# Patient Record
Sex: FEMALE | Race: WHITE
[De-identification: names, ages, dates, MRNs, and addresses within clinical notes are randomized per-mention and may not be internally consistent; named-entity substitution may affect disease eponyms.]

---

## 2017-04-06 ENCOUNTER — HOSPITAL ENCOUNTER (INPATIENT)
Dept: HOSPITAL 12 - SRC | Age: 32
LOS: 9 days | Discharge: HOME | DRG: 895 | End: 2017-04-15
Attending: INTERNAL MEDICINE | Admitting: INTERNAL MEDICINE
Payer: COMMERCIAL

## 2017-04-06 VITALS — WEIGHT: 125 LBS | BODY MASS INDEX: 18.94 KG/M2 | HEIGHT: 68 IN

## 2017-04-06 DIAGNOSIS — F13.230: ICD-10-CM

## 2017-04-06 DIAGNOSIS — E28.2: ICD-10-CM

## 2017-04-06 DIAGNOSIS — F10.230: Primary | ICD-10-CM

## 2017-04-06 DIAGNOSIS — E87.6: ICD-10-CM

## 2017-04-06 DIAGNOSIS — Z81.8: ICD-10-CM

## 2017-04-06 DIAGNOSIS — Z81.1: ICD-10-CM
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
## 2017-04-06 DIAGNOSIS — F41.9: ICD-10-CM

## 2017-04-06 DIAGNOSIS — F12.90: ICD-10-CM

## 2017-04-06 DIAGNOSIS — S02.609D: ICD-10-CM

## 2017-04-06 DIAGNOSIS — Z79.899: ICD-10-CM

## 2017-04-06 DIAGNOSIS — E86.0: ICD-10-CM

## 2017-04-06 DIAGNOSIS — G89.21: ICD-10-CM
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
## 2017-04-06 DIAGNOSIS — F90.9: ICD-10-CM

## 2017-04-06 DIAGNOSIS — F32.9: ICD-10-CM

## 2017-04-06 DIAGNOSIS — G43.909: ICD-10-CM

## 2017-04-06 DIAGNOSIS — E83.42: ICD-10-CM

## 2017-04-06 DIAGNOSIS — K21.9: ICD-10-CM

## 2017-04-06 PROCEDURE — C9113 INJ PANTOPRAZOLE SODIUM, VIA: HCPCS

## 2017-04-06 PROCEDURE — A4663 DIALYSIS BLOOD PRESSURE CUFF: HCPCS

## 2017-04-08 VITALS — SYSTOLIC BLOOD PRESSURE: 124 MMHG | DIASTOLIC BLOOD PRESSURE: 76 MMHG

## 2017-04-08 VITALS — DIASTOLIC BLOOD PRESSURE: 66 MMHG | SYSTOLIC BLOOD PRESSURE: 112 MMHG

## 2017-04-08 VITALS — SYSTOLIC BLOOD PRESSURE: 97 MMHG | DIASTOLIC BLOOD PRESSURE: 68 MMHG

## 2017-04-08 VITALS — DIASTOLIC BLOOD PRESSURE: 69 MMHG | SYSTOLIC BLOOD PRESSURE: 109 MMHG

## 2017-04-08 VITALS — DIASTOLIC BLOOD PRESSURE: 82 MMHG | SYSTOLIC BLOOD PRESSURE: 127 MMHG

## 2017-04-08 LAB
ALBUMIN SERPL BCG-MCNC: 3.4 G/DL (ref 3.4–5)
ALP SERPL-CCNC: 86 U/L (ref 50–136)
ALT SERPL W/O P-5'-P-CCNC: 25 U/L (ref 14–59)
AMPHETAMINES UR QL SCN>1000 NG/ML: NEGATIVE
AST SERPL-CCNC: 24 U/L (ref 15–37)
BARBITURATES UR QL SCN: NEGATIVE
BASOPHILS # BLD AUTO: 0 K/UL (ref 0–0.2)
BASOPHILS NFR BLD AUTO: 0.5 % (ref 0–2)
BILIRUB SERPL-MCNC: 0.2 MG/DL (ref 0.2–1)
BUN SERPL-MCNC: 9 MG/DL (ref 7–18)
CALCIUM SERPL-MCNC: 8.3 MG/DL (ref 8.5–10.1)
CHLORIDE SERPL-SCNC: 101 MMOL/L (ref 98–107)
CO2 SERPL-SCNC: 27 MMOL/L (ref 21–32)
COCAINE UR QL SCN: NEGATIVE
CREAT SERPL-MCNC: 0.8 MG/DL (ref 0.6–1.3)
EOSINOPHIL # BLD AUTO: 0.1 K/UL (ref 0–0.7)
EOSINOPHIL NFR BLD AUTO: 0.9 % (ref 0–7)
ERYTHROCYTE [DISTWIDTH] IN BLOOD BY AUTOMATED COUNT: 12.1 % (ref 11.5–14.5)
ETHANOL SERPL-MCNC: 69 MG/DL (ref 0–0)
GFR SERPLBLD BASED ON 1.73 SQ M-ARVRAT: 84 ML/MIN (ref 60–?)
GLUCOSE SERPL-MCNC: 100 MG/DL (ref 74–106)
HCG UR QL: NEGATIVE
HCT VFR BLD AUTO: 40.3 % (ref 37–47)
HGB BLD-MCNC: 14.2 G/DL (ref 12–16)
HIV 1+2 AB+HIV1P24 AG SERPLBLD IA.RAPID: NON REACTIVE
LYMPHOCYTES # BLD AUTO: 4 K/UL (ref 0.8–4.8)
LYMPHOCYTES NFR BLD AUTO: 49.5 % (ref 20.5–51.5)
MAGNESIUM SERPL-MCNC: 1.3 MG/DL (ref 1.8–2.4)
MCH RBC QN AUTO: 32.2 UUG (ref 27–31)
MCHC RBC AUTO-ENTMCNC: 35 G/DL (ref 32–37)
MCV RBC AUTO: 91.4 FL (ref 81–99)
MONOCYTES # BLD AUTO: 0.5 K/UL (ref 0.1–1.3)
MONOCYTES NFR BLD AUTO: 6.2 % (ref 0–11)
NEUTROPHILS # BLD AUTO: 3.5 K/UL (ref 1.8–8.9)
NEUTROPHILS NFR BLD AUTO: 42.9 % (ref 38.5–71.5)
OPIATES UR QL SCN: NEGATIVE
PCP UR QL SCN>25 NG/ML: NEGATIVE
PLATELET # BLD AUTO: 260 K/UL (ref 150–450)
POTASSIUM SERPL-SCNC: 3.2 MMOL/L (ref 3.5–5.1)
RBC # BLD AUTO: 4.41 MIL/UL (ref 4.2–5.4)
SODIUM SERPL-SCNC: 139 MMOL/L (ref 136–145)
THC UR QL SCN>50 NG/ML: POSITIVE
TSH SERPL DL<=0.005 MIU/L-ACNC: 1.9 MIU/ML (ref 0.36–3.74)
WBC # BLD AUTO: 8.1 K/UL (ref 4–11.2)
WS STN SPEC: 6.7 G/DL (ref 6.4–8.2)

## 2017-04-08 PROCEDURE — HZ2ZZZZ DETOXIFICATION SERVICES FOR SUBSTANCE ABUSE TREATMENT: ICD-10-PCS | Performed by: INTERNAL MEDICINE

## 2017-04-08 RX ADMIN — MAGNESIUM HYDROXIDE PRN ML: 400 SUSPENSION ORAL at 03:16

## 2017-04-08 RX ADMIN — MAGNESIUM HYDROXIDE PRN ML: 400 SUSPENSION ORAL at 22:59

## 2017-04-08 RX ADMIN — Medication PRN MG: at 17:41

## 2017-04-08 RX ADMIN — LORAZEPAM SCH MG: 1 TABLET ORAL at 21:11

## 2017-04-08 RX ADMIN — METHOCARBAMOL TABLETS PRN MG: 750 TABLET, COATED ORAL at 06:12

## 2017-04-08 RX ADMIN — DICYCLOMINE HYDROCHLORIDE PRN MG: 20 TABLET ORAL at 21:11

## 2017-04-08 RX ADMIN — THERA TABS SCH UDTAB: TAB at 08:45

## 2017-04-08 RX ADMIN — KETOROLAC TROMETHAMINE PRN MG: 30 INJECTION, SOLUTION INTRAMUSCULAR; INTRAVENOUS at 22:59

## 2017-04-08 RX ADMIN — BUPRENORPHINE HYDROCHLORIDE SCH MG: 2 TABLET SUBLINGUAL at 21:12

## 2017-04-08 RX ADMIN — Medication PRN MG: at 06:12

## 2017-04-08 RX ADMIN — SPIRONOLACTONE SCH MG: 50 TABLET, FILM COATED ORAL at 17:36

## 2017-04-08 RX ADMIN — Medication SCH MG: at 12:51

## 2017-04-08 RX ADMIN — BUPRENORPHINE HYDROCHLORIDE SCH MG: 2 TABLET SUBLINGUAL at 17:36

## 2017-04-08 RX ADMIN — LORAZEPAM SCH MG: 1 TABLET ORAL at 12:50

## 2017-04-08 RX ADMIN — CLONIDINE HYDROCHLORIDE PRN MG: 0.1 TABLET ORAL at 03:16

## 2017-04-08 RX ADMIN — BUPRENORPHINE HYDROCHLORIDE SCH MG: 2 TABLET SUBLINGUAL at 12:51

## 2017-04-08 RX ADMIN — LORAZEPAM SCH MG: 1 TABLET ORAL at 17:36

## 2017-04-08 RX ADMIN — TOPIRAMATE SCH MG: 100 TABLET, COATED ORAL at 12:51

## 2017-04-08 RX ADMIN — ONDANSETRON PRN MG: 4 TABLET, ORALLY DISINTEGRATING ORAL at 03:16

## 2017-04-08 NOTE — NUR
PRN Administration

Patient complaine of 7/10 jaw pain, body aches & mild headache. PRN Robaxin & Motrin given 
as ordered. Will continue to monitor.
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
## 2017-04-08 NOTE — NUR
PRN Reassessment

Patient verbalized slight relief from body aches and headache. Patient lying in bed and not 
in any s/s of distress. Will continue to monitor patient.
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
## 2017-04-08 NOTE — NUR
PRN administration

Patient complains of anxiety, constipation & nausea. Patient reported that her last bowel 
mvoement was a week ago. No episode of vomiting noted. Vitals WNL. PRN Clonidine, MOM, 
Zofran given as ordered. Will reassess for effectiveness of medicaiton

## 2017-04-08 NOTE — NUR
PRN Reassessment

Patient verbalized improved nasuea and verbalized decrease in anxiety. PRn medication 
effective. Will continue to monitor bowel movement.

## 2017-04-08 NOTE — NUR
END of shit note:

Patient is a 30 y/o female admitted for Opiate dependence. Patient is on a soft diet with 
multiple allergies noted. Full Code status. Patient was given PRn Clonidine for anxiety, MOM 
for constipation, Zofran for nausea, Motrin for headache & robaxin for body aches and were 
effective. During assessment, pt was having inconsistencies of reported allergies. Will 
followup with Dr. Pelayo. Initial COWS is 10. Last COWS is 8. Pt remained stable and vitals 
WNL. Pt was having trouble sleeping and slept for 1 hour. Pt consumed 355ml of fluids. 
Voided 1x with no bowel movement noted. Safety precautions are in place. will continue to 
monitor.

## 2017-04-08 NOTE — NUR
Urine collected

Urine for drug screen collected. Pt able to void clear yellow urine freely with no problems.
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
## 2017-04-08 NOTE — NUR
PRN REASSESSMENT

Medication effective, pt reported a decrease in pain to 1/10, all needs met all safety 
measures in place.
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
## 2017-04-08 NOTE — NUR
PRN MEDICATION

Pt c/o toothache/ jaw pain rating it 5/10 requested something for relief, 
non-pharmacological techniques interventions provided x3 and were not effective. PRN Motrin 
400mg administered PO, educated pt about s/e of medication and when to contact nurse. all 
needs met, all safety measures in place, will continue to monitor.

## 2017-04-09 VITALS — DIASTOLIC BLOOD PRESSURE: 79 MMHG | SYSTOLIC BLOOD PRESSURE: 122 MMHG

## 2017-04-09 VITALS — SYSTOLIC BLOOD PRESSURE: 117 MMHG | DIASTOLIC BLOOD PRESSURE: 84 MMHG

## 2017-04-09 VITALS — DIASTOLIC BLOOD PRESSURE: 90 MMHG | SYSTOLIC BLOOD PRESSURE: 133 MMHG

## 2017-04-09 VITALS — SYSTOLIC BLOOD PRESSURE: 124 MMHG | DIASTOLIC BLOOD PRESSURE: 88 MMHG

## 2017-04-09 VITALS — SYSTOLIC BLOOD PRESSURE: 116 MMHG | DIASTOLIC BLOOD PRESSURE: 80 MMHG

## 2017-04-09 VITALS — DIASTOLIC BLOOD PRESSURE: 80 MMHG | SYSTOLIC BLOOD PRESSURE: 116 MMHG

## 2017-04-09 LAB
BUN SERPL-MCNC: 9 MG/DL (ref 7–18)
CALCIUM SERPL-MCNC: 9.2 MG/DL (ref 8.5–10.1)
CHLORIDE SERPL-SCNC: 99 MMOL/L (ref 98–107)
CO2 SERPL-SCNC: 29 MMOL/L (ref 21–32)
CREAT SERPL-MCNC: 0.8 MG/DL (ref 0.6–1.3)
GFR SERPLBLD BASED ON 1.73 SQ M-ARVRAT: 84 ML/MIN (ref 60–?)
GLUCOSE SERPL-MCNC: 110 MG/DL (ref 74–106)
MAGNESIUM SERPL-MCNC: 2 MG/DL (ref 1.8–2.4)
POTASSIUM SERPL-SCNC: 4.2 MMOL/L (ref 3.5–5.1)
SODIUM SERPL-SCNC: 136 MMOL/L (ref 136–145)

## 2017-04-09 PROCEDURE — HZ51ZZZ INDIVIDUAL PSYCHOTHERAPY FOR SUBSTANCE ABUSE TREATMENT, BEHAVIORAL: ICD-10-PCS

## 2017-04-09 RX ADMIN — SPIRONOLACTONE SCH MG: 50 TABLET, FILM COATED ORAL at 09:31

## 2017-04-09 RX ADMIN — GABAPENTIN SCH MG: 300 CAPSULE ORAL at 21:12

## 2017-04-09 RX ADMIN — METHOCARBAMOL TABLETS PRN MG: 750 TABLET, COATED ORAL at 17:00

## 2017-04-09 RX ADMIN — FOLIC ACID SCH MG: 1 TABLET ORAL at 09:26

## 2017-04-09 RX ADMIN — Medication SCH MG: at 09:26

## 2017-04-09 RX ADMIN — Medication SCH EA: at 09:28

## 2017-04-09 RX ADMIN — LORAZEPAM SCH MG: 1 TABLET ORAL at 15:06

## 2017-04-09 RX ADMIN — THERA TABS SCH UDTAB: TAB at 09:26

## 2017-04-09 RX ADMIN — GABAPENTIN SCH MG: 300 CAPSULE ORAL at 09:26

## 2017-04-09 RX ADMIN — Medication PRN MG: at 04:44

## 2017-04-09 RX ADMIN — LORAZEPAM SCH MG: 1 TABLET ORAL at 21:12

## 2017-04-09 RX ADMIN — Medication SCH MG: at 09:27

## 2017-04-09 RX ADMIN — BUPRENORPHINE HYDROCHLORIDE SCH MG: 2 TABLET SUBLINGUAL at 15:06

## 2017-04-09 RX ADMIN — GABAPENTIN SCH MG: 300 CAPSULE ORAL at 15:06

## 2017-04-09 RX ADMIN — BUPRENORPHINE HYDROCHLORIDE SCH MG: 2 TABLET SUBLINGUAL at 21:13

## 2017-04-09 RX ADMIN — SPIRONOLACTONE SCH MG: 50 TABLET, FILM COATED ORAL at 16:56

## 2017-04-09 RX ADMIN — LORAZEPAM SCH MG: 1 TABLET ORAL at 09:26

## 2017-04-09 RX ADMIN — BUPRENORPHINE HYDROCHLORIDE SCH MG: 2 TABLET SUBLINGUAL at 09:27

## 2017-04-09 RX ADMIN — THERA TABS SCH UDTAB: TAB at 09:36

## 2017-04-09 RX ADMIN — KETOROLAC TROMETHAMINE PRN MG: 30 INJECTION, SOLUTION INTRAMUSCULAR; INTRAVENOUS at 15:09

## 2017-04-09 RX ADMIN — TOPIRAMATE SCH MG: 100 TABLET, COATED ORAL at 09:27
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
## 2017-04-09 NOTE — NUR
PRN Motrin:



Pt complains of jaw pain. Pt rates pain 5/10. Administered PRN Motrin as ordered. Will 
continue to monitor.
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
## 2017-04-09 NOTE — NUR
Reassessment:



Pt states that her jaw pain is now 3/10. PRN Toradol effective AEB decrease in pain from 
8/10 to 3/10. Will continue to monitor.
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
## 2017-04-10 VITALS — SYSTOLIC BLOOD PRESSURE: 107 MMHG | DIASTOLIC BLOOD PRESSURE: 79 MMHG

## 2017-04-10 VITALS — DIASTOLIC BLOOD PRESSURE: 85 MMHG | SYSTOLIC BLOOD PRESSURE: 126 MMHG

## 2017-04-10 VITALS — SYSTOLIC BLOOD PRESSURE: 148 MMHG | DIASTOLIC BLOOD PRESSURE: 112 MMHG

## 2017-04-10 VITALS — DIASTOLIC BLOOD PRESSURE: 71 MMHG | SYSTOLIC BLOOD PRESSURE: 115 MMHG

## 2017-04-10 VITALS — DIASTOLIC BLOOD PRESSURE: 80 MMHG | SYSTOLIC BLOOD PRESSURE: 119 MMHG

## 2017-04-10 VITALS — DIASTOLIC BLOOD PRESSURE: 78 MMHG | SYSTOLIC BLOOD PRESSURE: 122 MMHG

## 2017-04-10 VITALS — SYSTOLIC BLOOD PRESSURE: 126 MMHG | DIASTOLIC BLOOD PRESSURE: 79 MMHG

## 2017-04-10 RX ADMIN — Medication SCH MG: at 09:11

## 2017-04-10 RX ADMIN — DEXTROSE AND SODIUM CHLORIDE SCH MLS/HR: 5; .45 INJECTION, SOLUTION INTRAVENOUS at 14:33

## 2017-04-10 RX ADMIN — THERA TABS SCH UDTAB: TAB at 09:00

## 2017-04-10 RX ADMIN — LORAZEPAM SCH MG: 1 TABLET ORAL at 12:27

## 2017-04-10 RX ADMIN — SPIRONOLACTONE SCH MG: 50 TABLET, FILM COATED ORAL at 09:11

## 2017-04-10 RX ADMIN — SPIRONOLACTONE SCH MG: 50 TABLET, FILM COATED ORAL at 16:00

## 2017-04-10 RX ADMIN — Medication SCH EA: at 09:10

## 2017-04-10 RX ADMIN — Medication PRN MG: at 16:00

## 2017-04-10 RX ADMIN — GABAPENTIN SCH MG: 300 CAPSULE ORAL at 09:11

## 2017-04-10 RX ADMIN — KETOROLAC TROMETHAMINE PRN MG: 30 INJECTION, SOLUTION INTRAMUSCULAR; INTRAVENOUS at 12:27

## 2017-04-10 RX ADMIN — GABAPENTIN SCH MG: 300 CAPSULE ORAL at 16:00

## 2017-04-10 RX ADMIN — TOPIRAMATE SCH MG: 100 TABLET, COATED ORAL at 09:11

## 2017-04-10 RX ADMIN — POLYETHYLENE GLYCOL 3350 PRN GM: 17 POWDER, FOR SOLUTION ORAL at 20:59

## 2017-04-10 RX ADMIN — LORAZEPAM SCH MG: 1 TABLET ORAL at 09:17

## 2017-04-10 RX ADMIN — DICYCLOMINE HYDROCHLORIDE PRN MG: 20 TABLET ORAL at 16:00

## 2017-04-10 RX ADMIN — NAPHAZOLINE HYDROCHLORIDE AND PHENIRAMINE MALEATE PRN ML: .25; 3 SOLUTION/ DROPS OPHTHALMIC at 21:01

## 2017-04-10 RX ADMIN — THERA TABS SCH UDTAB: TAB at 09:11

## 2017-04-10 RX ADMIN — BUPRENORPHINE HYDROCHLORIDE SCH MG: 2 TABLET SUBLINGUAL at 20:29

## 2017-04-10 RX ADMIN — LORAZEPAM SCH MG: 1 TABLET ORAL at 16:05

## 2017-04-10 RX ADMIN — FOLIC ACID SCH MG: 1 TABLET ORAL at 09:11

## 2017-04-10 RX ADMIN — LORAZEPAM SCH MG: 1 TABLET ORAL at 20:29

## 2017-04-10 RX ADMIN — ONDANSETRON PRN MG: 4 TABLET, ORALLY DISINTEGRATING ORAL at 12:27

## 2017-04-10 RX ADMIN — DEXTROSE AND SODIUM CHLORIDE SCH MLS/HR: 5; .45 INJECTION, SOLUTION INTRAVENOUS at 21:05

## 2017-04-10 RX ADMIN — DEXTROSE SCH MG: 50 INJECTION, SOLUTION INTRAVENOUS at 14:26

## 2017-04-10 RX ADMIN — BUPRENORPHINE HYDROCHLORIDE SCH MG: 2 TABLET SUBLINGUAL at 16:00
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
## 2017-04-10 NOTE — NUR
End of Shift



Patient is a 31-year old,  female, admitted for  ETOH, Opiates, and Benzodiazepines 
dependence.  Pt is on a 5 day Subutex and 5 day Valium tapers, started 04/08/2017. Patient 
is tolerating tapers well.  Pt is AAOx4, no SOB nor anxiety noted. Pt is ambulatory with 
steady gait and with no skin issues. Fall, universal and safety prec implemented. Call light 
within reach. Kept pt warm, dry and comfortable. COWS=4, CIWA=3, slept for 5 hours. Endorsed 
to AM shift nurse for continuity of care.

## 2017-04-10 NOTE — NUR
PRN 

Upon reassessment, no reports of bowel movement. will continue to monitor effectiveness of 
Miralax. 

Pt states relief in itchiness in eyes. eye drops effective. 

Safety measures in place, Will continue to monitor. 




-------------------------------------------------------------------------------

Addendum: 04/10/17 at 2342 by VICTOR MANUEL WILLIS RN

-------------------------------------------------------------------------------

PRN REASSESSMENT

## 2017-04-10 NOTE — NUR
PRN Administration 

Pt reports constipation, requested aid. Miralax 17 gm PRN administered. 

Pt reports itchiness and discomfort in eyes. Naphcon-A Opht drops administered. 

Safety measures in place, will continue to monitor.
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
## 2017-04-10 NOTE — NUR
V/S-Elevated BP

1600H  V/S: T. 98.1  HR 87  RR 18  /112 SpO2 98% RA  0/10 pain  COWS 5 CIWA 5; 
Scheduled medications given and reassessment at 1630 of /80 and HR 63 after medication 
administration. Will cont. to monitor the pt.

## 2017-04-10 NOTE — NUR
Reassessment

Pt is in room asleep with a nap with HOB semi-fowlers; no non-verbal clues of nausea or pain 
present at this time so Toradol and Zofran is effective. Will cont. to monitor the pt.
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
## 2017-04-10 NOTE — NUR
IVF 

New bag of  D5 1/2 NS started,  running at 125 cc/hr. IV site intact, patent/flushing well. 

Safety measures in place, Will continue to monitor.
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
## 2017-04-11 VITALS — DIASTOLIC BLOOD PRESSURE: 77 MMHG | SYSTOLIC BLOOD PRESSURE: 131 MMHG

## 2017-04-11 VITALS — DIASTOLIC BLOOD PRESSURE: 69 MMHG | SYSTOLIC BLOOD PRESSURE: 111 MMHG

## 2017-04-11 VITALS — SYSTOLIC BLOOD PRESSURE: 119 MMHG | DIASTOLIC BLOOD PRESSURE: 85 MMHG

## 2017-04-11 VITALS — DIASTOLIC BLOOD PRESSURE: 68 MMHG | SYSTOLIC BLOOD PRESSURE: 110 MMHG

## 2017-04-11 VITALS — SYSTOLIC BLOOD PRESSURE: 94 MMHG | DIASTOLIC BLOOD PRESSURE: 67 MMHG

## 2017-04-11 VITALS — DIASTOLIC BLOOD PRESSURE: 87 MMHG | SYSTOLIC BLOOD PRESSURE: 118 MMHG

## 2017-04-11 RX ADMIN — POLYETHYLENE GLYCOL 3350 PRN GM: 17 POWDER, FOR SOLUTION ORAL at 16:14

## 2017-04-11 RX ADMIN — Medication SCH EA: at 08:32

## 2017-04-11 RX ADMIN — KETOROLAC TROMETHAMINE PRN MG: 30 INJECTION, SOLUTION INTRAMUSCULAR; INTRAVENOUS at 01:55

## 2017-04-11 RX ADMIN — LORAZEPAM SCH MG: 1 TABLET ORAL at 16:06

## 2017-04-11 RX ADMIN — LORAZEPAM SCH MG: 1 TABLET ORAL at 08:33

## 2017-04-11 RX ADMIN — THERA TABS SCH UDTAB: TAB at 08:33

## 2017-04-11 RX ADMIN — BUPRENORPHINE HYDROCHLORIDE SCH MG: 2 TABLET SUBLINGUAL at 08:32

## 2017-04-11 RX ADMIN — FOLIC ACID SCH MG: 1 TABLET ORAL at 08:33

## 2017-04-11 RX ADMIN — NAPHAZOLINE HYDROCHLORIDE AND PHENIRAMINE MALEATE PRN ML: .25; 3 SOLUTION/ DROPS OPHTHALMIC at 08:44

## 2017-04-11 RX ADMIN — DEXTROSE SCH MG: 50 INJECTION, SOLUTION INTRAVENOUS at 08:33

## 2017-04-11 RX ADMIN — BUPRENORPHINE HYDROCHLORIDE SCH MG: 2 TABLET SUBLINGUAL at 20:58

## 2017-04-11 RX ADMIN — GABAPENTIN SCH MG: 300 CAPSULE ORAL at 08:32

## 2017-04-11 RX ADMIN — SPIRONOLACTONE SCH MG: 50 TABLET, FILM COATED ORAL at 08:32

## 2017-04-11 RX ADMIN — Medication SCH MG: at 08:32

## 2017-04-11 RX ADMIN — Medication PRN MG: at 16:14

## 2017-04-11 RX ADMIN — CLONIDINE HYDROCHLORIDE PRN MG: 0.1 TABLET ORAL at 08:51

## 2017-04-11 RX ADMIN — METHOCARBAMOL TABLETS PRN MG: 750 TABLET, COATED ORAL at 05:45

## 2017-04-11 RX ADMIN — GABAPENTIN SCH MG: 300 CAPSULE ORAL at 20:58

## 2017-04-11 RX ADMIN — THERA TABS SCH UDTAB: TAB at 08:34

## 2017-04-11 RX ADMIN — TOPIRAMATE SCH MG: 100 TABLET, COATED ORAL at 08:33

## 2017-04-11 RX ADMIN — SPIRONOLACTONE SCH MG: 50 TABLET, FILM COATED ORAL at 16:06

## 2017-04-11 RX ADMIN — GABAPENTIN SCH MG: 300 CAPSULE ORAL at 16:06

## 2017-04-11 RX ADMIN — LORAZEPAM SCH MG: 1 TABLET ORAL at 20:58

## 2017-04-11 RX ADMIN — DEXTROSE AND SODIUM CHLORIDE SCH MLS/HR: 5; .45 INJECTION, SOLUTION INTRAVENOUS at 05:06

## 2017-04-11 RX ADMIN — KETOROLAC TROMETHAMINE PRN MG: 30 INJECTION, SOLUTION INTRAMUSCULAR; INTRAVENOUS at 08:51

## 2017-04-11 RX ADMIN — BUPRENORPHINE HYDROCHLORIDE SCH MG: 2 TABLET SUBLINGUAL at 16:06

## 2017-04-11 RX ADMIN — Medication PRN MG: at 08:51

## 2017-04-11 NOTE — NUR
PRN Medication Administration 

Pt c/o burning eyes, jaw and generalized pain 8/10 and is very anxious with restlessness, 
crying, irritability, agitation and pacing, ; PRN Naphcon-A eye gtts, Toradol 30mg IM, 
and Clonidine 0.1mg given with 0900H scheduled medications. Will reassess in 1H.  

-------------------------------------------------------------------------------

Addendum: 04/11/17 at 1659 by BRIDGET SALGADO RN

-------------------------------------------------------------------------------

PRN Colace 250mg given also since pt states that she has not had a BM in 3 days.

## 2017-04-11 NOTE — NUR
PRN Reassessment

Upon reassessment, of Toradol, pt is sleeping, no verbal cues of pain verbalized/noted. 

Safety measures in place, Will continue to monitor.
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
## 2017-04-11 NOTE — NUR
PRN Robaxin Reassessment

Upon reassessment of Robaxin, pt states a decrease in muscle aches. 

Safety measures in place, Will continue to monitor.

## 2017-04-11 NOTE — NUR
PRN Medication Administration 

Pt c/o no BM in more than 3 days; PRN Miralax with another dose of PRN Colace 250mg given as 
ordered. Will cont. to monitor the pt.

## 2017-04-11 NOTE — NUR
Vital Signs

/69, Pulse 81, SpO2 98%, respirations 14, temp 97.7, no reports of pain. 

COWS/CIWA deferred d/t pt sleeping, to assess while pt is awake as ordered. 

Safety measures in place, Will continue to monitor.
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
## 2017-04-11 NOTE — NUR
Reassessment

Pt is in room getting getting ready to take a shower, decreased anxiety and agitations with 
no crying present and pt denies pain 0/10 and no burning and itchiness in OU; PRN Naphcon-A, 
Toradol and Clonidine are effective. Will cont. to monitor the pt.

## 2017-04-11 NOTE — NUR
IVF completed, removed IV from left forearm, site cleansed and bandaged. Pt needs met, 
safety measures in place, will continue to monitor.

## 2017-04-11 NOTE — NUR
PRN Robaxin & Reassessment of Toradol 

Upon awakening, pt reported muscle aches, requested relief. Robaxin 750mg PRN administered.

Pt verbalizes pain in jaw 3/10 and subsiding pain - Toradol effective. 

Safety measures in place, will continue to monitor.
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
## 2017-04-11 NOTE — NUR
Vital Signs

BP 94/67, Pulse 74, SpO2 97%, respirations 14, temp 98, no reports of pain. 

COWS/CIWA deferred d/t pt sleeping, to assess while pt is awake as ordered. 

Safety measures in place, Will continue to monitor.

## 2017-04-11 NOTE — NUR
PRN Administration 

Upon awakening, pt reported aching pain in right lower jaw rated 9/10. Pt requested relief. 

Toradol injection 30mg/1ml administered. 

Safety measures in place, will continue to monitor.

## 2017-04-12 VITALS — DIASTOLIC BLOOD PRESSURE: 78 MMHG | SYSTOLIC BLOOD PRESSURE: 109 MMHG

## 2017-04-12 VITALS — SYSTOLIC BLOOD PRESSURE: 121 MMHG | DIASTOLIC BLOOD PRESSURE: 73 MMHG

## 2017-04-12 VITALS — DIASTOLIC BLOOD PRESSURE: 74 MMHG | SYSTOLIC BLOOD PRESSURE: 102 MMHG

## 2017-04-12 VITALS — SYSTOLIC BLOOD PRESSURE: 107 MMHG | DIASTOLIC BLOOD PRESSURE: 68 MMHG

## 2017-04-12 VITALS — SYSTOLIC BLOOD PRESSURE: 113 MMHG | DIASTOLIC BLOOD PRESSURE: 77 MMHG

## 2017-04-12 VITALS — DIASTOLIC BLOOD PRESSURE: 68 MMHG | SYSTOLIC BLOOD PRESSURE: 110 MMHG

## 2017-04-12 RX ADMIN — KETOROLAC TROMETHAMINE PRN MG: 30 INJECTION, SOLUTION INTRAMUSCULAR; INTRAVENOUS at 20:40

## 2017-04-12 RX ADMIN — LORAZEPAM SCH MG: 1 TABLET ORAL at 20:39

## 2017-04-12 RX ADMIN — FOLIC ACID SCH MG: 1 TABLET ORAL at 08:48

## 2017-04-12 RX ADMIN — Medication SCH EA: at 08:59

## 2017-04-12 RX ADMIN — LORAZEPAM SCH MG: 1 TABLET ORAL at 08:48

## 2017-04-12 RX ADMIN — BUPRENORPHINE HYDROCHLORIDE SCH MG: 2 TABLET SUBLINGUAL at 08:48

## 2017-04-12 RX ADMIN — KETOROLAC TROMETHAMINE PRN MG: 30 INJECTION, SOLUTION INTRAMUSCULAR; INTRAVENOUS at 02:55

## 2017-04-12 RX ADMIN — Medication SCH MG: at 08:48

## 2017-04-12 RX ADMIN — METHOCARBAMOL TABLETS PRN MG: 750 TABLET, COATED ORAL at 14:28

## 2017-04-12 RX ADMIN — THERA TABS SCH UDTAB: TAB at 08:48

## 2017-04-12 RX ADMIN — CALCIUM CARBONATE (ANTACID) CHEW TAB 500 MG PRN MG: 500 CHEW TAB at 01:17

## 2017-04-12 RX ADMIN — ONDANSETRON PRN MG: 4 TABLET, ORALLY DISINTEGRATING ORAL at 20:40

## 2017-04-12 RX ADMIN — THERA TABS SCH UDTAB: TAB at 09:40

## 2017-04-12 RX ADMIN — DICYCLOMINE HYDROCHLORIDE PRN MG: 20 TABLET ORAL at 14:28

## 2017-04-12 RX ADMIN — CLONIDINE HYDROCHLORIDE PRN MG: 0.1 TABLET ORAL at 05:46

## 2017-04-12 RX ADMIN — SPIRONOLACTONE SCH MG: 50 TABLET, FILM COATED ORAL at 08:58

## 2017-04-12 RX ADMIN — GABAPENTIN SCH MG: 300 CAPSULE ORAL at 08:48

## 2017-04-12 RX ADMIN — METHOCARBAMOL TABLETS PRN MG: 750 TABLET, COATED ORAL at 01:20

## 2017-04-12 RX ADMIN — BUPRENORPHINE HYDROCHLORIDE SCH MG: 2 TABLET SUBLINGUAL at 20:38

## 2017-04-12 RX ADMIN — CLONIDINE HYDROCHLORIDE PRN MG: 0.1 TABLET ORAL at 23:08

## 2017-04-12 RX ADMIN — SPIRONOLACTONE SCH MG: 50 TABLET, FILM COATED ORAL at 16:31

## 2017-04-12 RX ADMIN — PANTOPRAZOLE SODIUM SCH MG: 40 TABLET, DELAYED RELEASE ORAL at 10:01

## 2017-04-12 RX ADMIN — GABAPENTIN SCH MG: 300 CAPSULE ORAL at 14:19

## 2017-04-12 RX ADMIN — TOPIRAMATE SCH MG: 100 TABLET, COATED ORAL at 08:48

## 2017-04-12 NOTE — NUR
END OF SHIFT NOTE

Gave report to night nurse, 31 year old female admitted for Opiate/Benzo/ETOH dependence. 
Allergies to PCN and Trazodone, soft diet, full code. Pt reported PMH: ADHD, anxiety, 
depression, PTSD, jaw fracture and polycystic ovarian syndrome. Pt cont on 5 day Ativan and 
5 day Subutex taper. Pt received PRN Robaxin and Bentyl with effectiveness. Pt attended 
groups and activities. Pt was seen by dietician and diet change from soft diet to mechanical 
soft diet. Vital signs remain stable. Last CIWA 4 COWS 5. Pt reports with stuffy 
nose,agitation, anxiety, and chills. Fall and seizure precautions in place.pt endorsed to 
night nurse in stable condition.

## 2017-04-12 NOTE — NUR
D/C 1:1

Pt's 1:1 supervision R/T safety precautions was discontinued after reassessing the pt, Pt 
stated " I am okay and I can walk fine". Pt ambulates independently with steady gait. Pt 
denies any feeling of fear. MD aware.

## 2017-04-12 NOTE — NUR
Vital Signs

/78, pulse 87, respirations 14, SpO2 100%, temp 97.9, no reports of pain. 

COWS/CIWA assessment deferred d/t pt sleeping, to assess while pt is awake as ordered. 

Safety measures in place, Will continue to monitor.

## 2017-04-12 NOTE — NUR
PT COMMUNICATION

Pt reported having x4 episode of diarrhea, offered Imodium pt refused states "I do not want 
to mess with my stomach". Risk and benefits explained. Safety measures in place. Pt in 
stable condition alert oriented x4.

## 2017-04-12 NOTE — NUR
Reassessment:



Pt reports that she is no longer nauseous and that her jaw pain is manageable. PRN Zofran 
and PRN Toradol effective. Encouraged fluids, crackers and other light foods to prevent 
recurrent episodes. Pt verbalizes understanding. Will continue to monitor.

## 2017-04-12 NOTE — NUR
EMESIS x1

Pt observed with brushing teeth and tongue and then observed with vomiting, offered PRN 
Zofran, pt refused. Endorsed to night nurse to follow up. Safety measures in place.

## 2017-04-12 NOTE — NUR
End of Shift

Pt is a 31 year old female admitted for Opiate/Benzo/ETOH dependence, placed on 5 day Ativan 
and 5 day Subutex taper. Allergies to PCN and Trazodone, soft diet, full code. PMH: ADHD, 
anxiety, depression, PTSD, jaw fracture and polycystic ovarian syndrome. During shift pt 
presented with anxiety, flushing/chills, muscle/joint aches, skin flushed/clammy  scheduled 
taper medications administered, COWS 5 and CIWA 4. IVF completed, removed IV from left 
forearm  site cleaned and bandaged. At 0117 Tums 500mg PRN administered for stomach upset, 
effective. Administered Robaxin 750mg PRN for muscle aches ineffective. Pt reported she was 
unable fall asleep d/t the aching/pulling pain 9/10 in her shoulders and knees. Toradol 
30mg/1ml PRN administered, effective for pain. Clonidine administered for anxiety, noted in 
behavioral note. Pt slept for 5 hours, intake of 1700 ml PO and void x2. VS stable, Safety 
measures in place, Endorsed to day shift nurse.

## 2017-04-12 NOTE — NUR
Clonidine Reassessment

Pt is in bed with eyes closed, no anxiety noted. 

Safety measures in place, Will continue to monitor.

## 2017-04-12 NOTE — NUR
PRN Benadryl and Clonidine:

Pt reports anxiety due to inability to sleep. Pt stated she also lost a contact lens which 
is making her uneasy. Pt requested medication to "calm her down" and help her go to sleep. 
/67, HR 88. Clonidine PRN given for anxiety and Benadryl PRN given for sleep. Will 
continue to monitor.

## 2017-04-12 NOTE — NUR
START OF SHIFT NOTE

Received report from night nurse, a 31 year old female admitted for Opiate/Benzo/ETOH 
dependence. Allergies to PCN and Trazodone, soft diet, full code. Pt reported PMH: ADHD, 
anxiety, depression, PTSD, jaw fracture and polycystic ovarian syndrome. Pt cont on 5 day 
Ativan and 5 day Subutex taper. Pt received Tums 500mg for stomach upset,Robaxin 750mg PRN 
for muscle aches ineffective per night nurse then administered Toradol 30mg/1ml PRN 
administered, effective for pain. Clonidine for anxiety,effective per night nurse. Pt slept 
for 5 hours, Last CIWA-4, COWS-5. Received pt alert awake oriented x4 in stable condition. 
Pt came to nursing station and asked for her contact lenses. Contact lenses provided to the 
pt. Pt did not report any pain or discomfort at this time. Safety measures in place. Will 
cont to provide safe and hazard free environment.

## 2017-04-12 NOTE — NUR
PRN Toradol and PRN Zofran:



Pt c/o severe jaw pain and rates pain 8/10. Pt noted to be able to eat almonds and a 
sandwich without difficulty. Administered PRN Toradol IM as ordered. Pt c/o nausea. Pt 
reports emesis x1 earlier in the day and refused medication at that time. Administered PRN 
Zofran as ordered. Will continue to monitor.

## 2017-04-12 NOTE — NUR
Start of Shift Note:

Report received from day shift nurse. Pt is a 32 yo female admitted on 04/08/17 for 
medically-supervised withdrawal from ETOH, opiates, and benzodiazepines. Pt reports drinking 
1500mL vodka daily for 2 days, using 2gm IV heroin daily for one week, and Ativan 1mg daily. 
Pt has completed a 5-day Ativan taper and in on the last day of a 5-day Subutex taper. Last 
day shift COWS=5,  CIWA=4. Pt is on a mechanical soft diet. Pt reports allergy to PCN and 
trazodone. Pt reports med hx: ADHD, anxiety, depression, PTSD, jaw fx s/p repair, and PCOS. 
Pt is at the nurses station at start of shift requesting medications. Pt attended half of 
one H&I group today. Pt noted to be resistant to non-pharmacological measures to alleviate 
anxiety and discomfort. Pt's complaints are out of proportion to objective s/s of 
withdrawal. Will reinforce teaching of coping skills. Will also reinforce importance of 
group attendance. Will continue to monitor.

## 2017-04-12 NOTE — NUR
CONSULT ORDER PLACED FOR SOFT FOOD PREFERENCES, RECENT JAW FRACTURE/DISLOCATION

SPOKE TO THE PATIENT, PT STATE THAT HAVING DIFFICULTIES WITH CURRENT TEXTURE, OFFER 
MSOFT(CHOPPED),PATIENT AGREES, SPOKE TO THE NURSE AND REC DOWNGRADE DIET TO MSOFT

FOOD PREFERENCES OBTAINED , ASSIST PATIENT ON MENU SELECTION

PT WAS RECEIVING BOOST TID, STATES DISLIKE BOOST, REC D/C TWO AND WILL SEND 1 BOOST WITH 
DINNER. WILL MONITOR PO INTAKE

-------------------------------------------------------------------------------

Addendum: 04/12/17 at 1619 by PATRICK FREDERICK RD

-------------------------------------------------------------------------------

Amended: Links added.

## 2017-04-12 NOTE — NUR
PRN Reassessment/Administration

Pt reports relief of stomach upset. 

Reports she cannot fall asleep d/t the aching/pulling pain 9/10 in her shoulders and knees. 

Toradol injection 30mg/1ml PRN administered. 

Safety measures in place, Will continue to monitor.

## 2017-04-12 NOTE — NUR
Behavioral Note 

At 0545, Pt reported I have been trying to sleep and I feel anxious, I cant fall asleep. 
Explained to pt that Benadryl cannot be administered at this time due to the morning hour. 
In response, Pt reported that it was not offered to her, even though Benadryl was offered x3 
with risks/benefits explained. 

Clonidine 0.1mg PRN administered for Anxiety. Pt refuses to turn off lights and keeps the TV 
on. Pt continues to behave in the same manner.

## 2017-04-12 NOTE — NUR
Behavioral Note 

During shift, while doing hourly rounds, Pt has been noted to be in room in bed with lights 
on, TV on with sound on full, reading and writing in notebook. 

Upon offering sleeping AID, pt reported she did not want Benadryl  and only wanted Toradol 
for pain, which was administered. Pt continuously REFUSED Benadryl  risk and benefits 
explained, medication offered 3 times. Non-pharmacological methods of turning TV off, 
turning lights off encouraged to pt. However, pt continued to behave in same manner with 
keeping lights on and TV on.

## 2017-04-12 NOTE — NUR
PRN Administration 

Tums 500mg PRN administered for stomach upset

Robaxin 750mg PRN administered for reports of muscle aches. 

Safety measures in place, Will continue to monitor.

## 2017-04-13 VITALS — SYSTOLIC BLOOD PRESSURE: 113 MMHG | DIASTOLIC BLOOD PRESSURE: 79 MMHG

## 2017-04-13 VITALS — SYSTOLIC BLOOD PRESSURE: 136 MMHG | DIASTOLIC BLOOD PRESSURE: 97 MMHG

## 2017-04-13 VITALS — DIASTOLIC BLOOD PRESSURE: 67 MMHG | SYSTOLIC BLOOD PRESSURE: 114 MMHG

## 2017-04-13 VITALS — DIASTOLIC BLOOD PRESSURE: 62 MMHG | SYSTOLIC BLOOD PRESSURE: 119 MMHG

## 2017-04-13 VITALS — SYSTOLIC BLOOD PRESSURE: 98 MMHG | DIASTOLIC BLOOD PRESSURE: 71 MMHG

## 2017-04-13 RX ADMIN — KETOROLAC TROMETHAMINE PRN MG: 30 INJECTION, SOLUTION INTRAMUSCULAR; INTRAVENOUS at 08:33

## 2017-04-13 RX ADMIN — Medication PRN MG: at 14:32

## 2017-04-13 RX ADMIN — METHOCARBAMOL TABLETS PRN MG: 750 TABLET, COATED ORAL at 14:32

## 2017-04-13 RX ADMIN — GABAPENTIN SCH MG: 300 CAPSULE ORAL at 20:23

## 2017-04-13 RX ADMIN — FOLIC ACID SCH MG: 1 TABLET ORAL at 08:31

## 2017-04-13 RX ADMIN — KETOROLAC TROMETHAMINE PRN MG: 30 INJECTION, SOLUTION INTRAMUSCULAR; INTRAVENOUS at 22:14

## 2017-04-13 RX ADMIN — TOPIRAMATE SCH MG: 100 TABLET, COATED ORAL at 08:31

## 2017-04-13 RX ADMIN — DICYCLOMINE HYDROCHLORIDE SCH MG: 20 TABLET ORAL at 14:32

## 2017-04-13 RX ADMIN — Medication SCH EA: at 08:32

## 2017-04-13 RX ADMIN — PANTOPRAZOLE SODIUM SCH MG: 40 TABLET, DELAYED RELEASE ORAL at 07:20

## 2017-04-13 RX ADMIN — ONDANSETRON PRN MG: 4 TABLET, ORALLY DISINTEGRATING ORAL at 14:32

## 2017-04-13 RX ADMIN — Medication SCH MG: at 08:31

## 2017-04-13 RX ADMIN — SPIRONOLACTONE SCH MG: 50 TABLET, FILM COATED ORAL at 08:32

## 2017-04-13 RX ADMIN — BUPRENORPHINE HYDROCHLORIDE SCH MG: 2 TABLET SUBLINGUAL at 20:23

## 2017-04-13 RX ADMIN — ONDANSETRON PRN MG: 4 TABLET, ORALLY DISINTEGRATING ORAL at 20:47

## 2017-04-13 RX ADMIN — SPIRONOLACTONE SCH MG: 50 TABLET, FILM COATED ORAL at 16:33

## 2017-04-13 RX ADMIN — THERA TABS SCH UDTAB: TAB at 08:31

## 2017-04-13 RX ADMIN — GABAPENTIN SCH MG: 300 CAPSULE ORAL at 14:32

## 2017-04-13 RX ADMIN — DICYCLOMINE HYDROCHLORIDE SCH MG: 20 TABLET ORAL at 20:23

## 2017-04-13 NOTE — NUR
Reassessment:



Pt is in bed with eyes closed. Respirations are even and unlabored. No s/s of acute distress 
noted. PRN Clonidine and PRN Benadryl effective AEB pt's ability to rest. Will continue to 
monitor.

## 2017-04-13 NOTE — NUR
PRN Zofran

Pt c/o nausea and requested for PRN Zofran.  Medication given and tolerated well.  Will 
reassess within 1 HR.  Will continue to monitor.

## 2017-04-13 NOTE — NUR
PRN Toradol

Pt c/o Jaw pain 9/10 and requested for PRN Toradol.  Medication given and tolerated well.  
Will reassess within 1 HR.  Will continue to monitor.
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
## 2017-04-13 NOTE — NUR
PRN Zofran Reassessment

Medication effective.  No s/s of ASE/distress noted at this time.  Respirations even and 
unlabored.  Will continue to monitor.

## 2017-04-13 NOTE — NUR
TORADOL REASSESSMENT

Patient reports medication with relief, current pain level 0/10, will continue to monitor.

## 2017-04-13 NOTE — NUR
Start of shift note

Received report from day shift nurse.  Pt is a 30 yo female, A+Ox4, presenting to [facility] for Opiate/ETOH/Benzo/Marijuana dependence.  Pt has Allergies to PCN and Trazodone. 
 Pt is on Fall precautions.  Pt has HX of ADHD, Anxiety, Depression, PTSD, Jaw FX, and 
Polycystic Ovarian Syndrome.  Pt has completed 5 day Subutex and Ativan tapers, tolerated 
well.  No s/s of distress noted at this time.  Respirations even and unlabored.  Will 
continue to monitor.

-------------------------------------------------------------------------------

Addendum: 04/13/17 at 2226 by ANURADHA HUNTER LVN

-------------------------------------------------------------------------------

**** Pt is on Full Code status and on Soft diet****

## 2017-04-13 NOTE — NUR
PRN TORADOL

Patient c/o pain 8/10 to jaw, provided with non pharmacological intervention with no relief, 
patient administered Toradol injection as ordered, will monitor effectiveness.

## 2017-04-13 NOTE — NUR
Vitals Refused:



Pt refuses ordered 04:00 v/s assessment. Pt educated on risks/benefits but still refuses. 
COWS/CIWA deferred at this time. All safety precautions are in place. Will continue to 
monitor.

-------------------------------------------------------------------------------

Addendum: 04/13/17 at 0518 by RIVER CAMPBELL RN

-------------------------------------------------------------------------------

Amended: Links added.

## 2017-04-13 NOTE — NUR
End of Shift Note:

Pt is a 32 yo female admitted to [facility] on 04/08/17 for medically-supervised withdrawal 
from ETOH, opiates, and benzodiazepines. Pt reports drinking 1500mL vodka daily for 2 days, 
using 2gm IV heroin daily for one week, and Ativan 1mg daily. Pt has completed a 5-day 
Ativan taper and has one remaining dose of a 5-day Subutex taper. Scheduled medication 
regime effectively managed s/s of withdrawal this shift, and COWS/CIWA scores decreased from 
6/7 to 3/2. PRN Clonidine was given for anxiety and PRN Zofran for nausea. PRN Toradol was 
given for jaw pain and PRN Benadryl was given for insomnia. Pt is on a mechanical soft diet. 
Pt reports allergy to PCN and Trazodone. Pt reports med hx: ADHD, anxiety, depression, PTSD, 
jaw fx s/p repair, and PCOS. V/S stable throughout shift. Total fluid intake this shift: 855 
ml; output: urine x 2 and BM x 0. Pt currently in bed and slept 10 hours this shift. Pt 
endorsed to day shift nurse.

## 2017-04-13 NOTE — NUR
PRN Toradol Reassessment

Medication effective.  Pain is now 4/10.  No s/s of ASE/distress noted at this time.  
Respirations even and unlabored.  Will continue to monitor.

## 2017-04-13 NOTE — NUR
PRN MOTRIN/ZOFRAN/ROBAXIN

Patient reports increase in nausea, no vomiting noted. patient also reports pain to jaw area 
6/10 and muscle aches. Patient administered Motrin/Zofran and Robaxin as ordered, will 
monitor effectiveness.

## 2017-04-13 NOTE — NUR
END OF SHIFT

Patient alert and oriented x4, vital signs were stable during shift, patient compliant with 
therapeutic plan of care. Patient with admitting Dx: opiate/etoh dependence, 0900 patient 
presented with: c/o chills, mild bone and joint aches, mild anxiety and mild agitation with 
cow score of: 3 and ciwa score of: 2; 1300 assessment patient presented with: c/o chills, 
mild anxiety and mild agitation with cow score of: 2 and ciwa score of: 2; 1700 assessment 
patient presented with: c/o chills, and mild anxiety with cow score eof: 2 and ciwa score 
of: 2. During shift patient was administered: one time dose of Subutex 2mg sl as per MD 
orders, during which patient presented with: at 1436: c/o chills, mild bone and joint aches, 
nausea and mild anxiety with cow score of: 5. Patient was administered PRN: Toradol 
injection at 0837, Motrin as ordered, Zofran as ordered, and Robaxin as ordered at 1432, all 
PRN medications administered were effective. Patient encouraged to attend group 
therapies/sessions to learn new coping skills to prevent relapse, noted attending and 
participating, denies SI/HI. Patient encouraged adequate PO fluid intake as tolerated. 
Safety measures in place. will continue to monitor. safety measures in place.

## 2017-04-13 NOTE — NUR
MD Communication

Notified MD of d/c'd medication, Toradol Q6PRN for Severe pain 8-10, and asked if he would 
like it continued.  MD stated that he would like the medication continued.  Placed order.

## 2017-04-14 VITALS — DIASTOLIC BLOOD PRESSURE: 62 MMHG | SYSTOLIC BLOOD PRESSURE: 107 MMHG

## 2017-04-14 VITALS — DIASTOLIC BLOOD PRESSURE: 68 MMHG | SYSTOLIC BLOOD PRESSURE: 100 MMHG

## 2017-04-14 VITALS — SYSTOLIC BLOOD PRESSURE: 82 MMHG | DIASTOLIC BLOOD PRESSURE: 44 MMHG

## 2017-04-14 VITALS — DIASTOLIC BLOOD PRESSURE: 42 MMHG | SYSTOLIC BLOOD PRESSURE: 92 MMHG

## 2017-04-14 VITALS — SYSTOLIC BLOOD PRESSURE: 104 MMHG | DIASTOLIC BLOOD PRESSURE: 62 MMHG

## 2017-04-14 VITALS — DIASTOLIC BLOOD PRESSURE: 69 MMHG | SYSTOLIC BLOOD PRESSURE: 112 MMHG

## 2017-04-14 LAB
AMPHETAMINES UR QL SCN>1000 NG/ML: NEGATIVE
BARBITURATES UR QL SCN: NEGATIVE
BUN SERPL-MCNC: 6 MG/DL (ref 7–18)
CALCIUM SERPL-MCNC: 8.8 MG/DL (ref 8.5–10.1)
CHLORIDE SERPL-SCNC: 100 MMOL/L (ref 98–107)
CO2 SERPL-SCNC: 26 MMOL/L (ref 21–32)
COCAINE UR QL SCN: NEGATIVE
CREAT SERPL-MCNC: 0.7 MG/DL (ref 0.6–1.3)
GFR SERPLBLD BASED ON 1.73 SQ M-ARVRAT: 98 ML/MIN (ref 60–?)
GLUCOSE SERPL-MCNC: 83 MG/DL (ref 74–106)
MAGNESIUM SERPL-MCNC: 1.7 MG/DL (ref 1.8–2.4)
OPIATES UR QL SCN: NEGATIVE
PCP UR QL SCN>25 NG/ML: NEGATIVE
PHOSPHATE SERPL-MCNC: 4.3 MG/DL (ref 2.5–4.9)
POTASSIUM SERPL-SCNC: 4.3 MMOL/L (ref 3.5–5.1)
SODIUM SERPL-SCNC: 133 MMOL/L (ref 136–145)
THC UR QL SCN>50 NG/ML: NEGATIVE

## 2017-04-14 RX ADMIN — KETOROLAC TROMETHAMINE PRN MG: 30 INJECTION, SOLUTION INTRAMUSCULAR; INTRAVENOUS at 20:30

## 2017-04-14 RX ADMIN — Medication PRN LOZ: at 13:49

## 2017-04-14 RX ADMIN — DICYCLOMINE HYDROCHLORIDE SCH MG: 20 TABLET ORAL at 08:55

## 2017-04-14 RX ADMIN — Medication SCH MG: at 08:55

## 2017-04-14 RX ADMIN — THERA TABS SCH UDTAB: TAB at 08:59

## 2017-04-14 RX ADMIN — SPIRONOLACTONE SCH MG: 50 TABLET, FILM COATED ORAL at 08:55

## 2017-04-14 RX ADMIN — DICYCLOMINE HYDROCHLORIDE SCH MG: 20 TABLET ORAL at 14:24

## 2017-04-14 RX ADMIN — Medication SCH EA: at 08:56

## 2017-04-14 RX ADMIN — BUPRENORPHINE HYDROCHLORIDE SCH MG: 2 TABLET SUBLINGUAL at 08:55

## 2017-04-14 RX ADMIN — METHOCARBAMOL TABLETS PRN MG: 750 TABLET, COATED ORAL at 17:07

## 2017-04-14 RX ADMIN — GABAPENTIN SCH MG: 300 CAPSULE ORAL at 08:55

## 2017-04-14 RX ADMIN — GABAPENTIN SCH MG: 300 CAPSULE ORAL at 14:24

## 2017-04-14 RX ADMIN — DICYCLOMINE HYDROCHLORIDE SCH MG: 20 TABLET ORAL at 20:29

## 2017-04-14 RX ADMIN — FOLIC ACID SCH MG: 1 TABLET ORAL at 08:55

## 2017-04-14 RX ADMIN — THERA TABS SCH UDTAB: TAB at 08:55

## 2017-04-14 RX ADMIN — CLONIDINE HYDROCHLORIDE PRN MG: 0.1 TABLET ORAL at 13:48

## 2017-04-14 RX ADMIN — GABAPENTIN SCH MG: 300 CAPSULE ORAL at 20:29

## 2017-04-14 RX ADMIN — TOPIRAMATE SCH MG: 100 TABLET, COATED ORAL at 08:55

## 2017-04-14 RX ADMIN — CALCIUM CARBONATE (ANTACID) CHEW TAB 500 MG PRN MG: 500 CHEW TAB at 00:33

## 2017-04-14 RX ADMIN — SPIRONOLACTONE SCH MG: 50 TABLET, FILM COATED ORAL at 17:03

## 2017-04-14 RX ADMIN — PANTOPRAZOLE SODIUM SCH MG: 40 TABLET, DELAYED RELEASE ORAL at 06:28

## 2017-04-14 NOTE — NUR
PRN Toradol

Pt c/o Jaw pain 8/10 and requested for PRN Toradol.  Medication given and tolerated well.  
Will reassess within 1 HR.  Will continue to monitor.

## 2017-04-14 NOTE — NUR
PRN Benadryl and TUMS

Pt c/o inability to sleep and upset stomach and requested for PRN Benadryl and Tums.  
Medications given and tolerated well.  Will reassess within 1 HR.  Will continue to monitor.
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
## 2017-04-14 NOTE — NUR
CEPACOL/CLONIDINE REASSESSMENT

Patient reports throat feels better and feels less anxious, safety measures in place. call 
light kept with in reach, will continue to monitor closely. safety measures in place.

## 2017-04-14 NOTE — NUR
MD COMMUNICATION

Patient reported that her left hip area is itchy area noted red and irritated, per Dr. Pelayo, new orders for benadryl cream 2% BID to left hip area, MD unable to input orders at 
this time. Orders noted and carried out, and verfied with MD. will continue to monitor.

## 2017-04-14 NOTE — NUR
END OF SHIFT

Patient alert and oriented x4, vital signs were stable during shift, patient compliant with 
therapeutic plan of care. Patient with admitting Dx: opiate/etoh dependence, 0900 patient 
presented with: mild bone and joint aches, mild anxiety and mild agitation with cow score 
of: 2 and ciwa score of: 2; 1300 assessment patient presented with: anxiety and irritability 
with cow score of: 2 and ciwa score of: 3; 1700 assessment patient presented with:mild bone 
and joint aches, and mild anxiety with cow score of: 2 and ciwa score of: 1. patient was 
administered last dose of Subutex taper this morning, well tolerated, no ASE noted. Patient 
is scheduled for discharge for tomorrow morning, noted self motivated towards sobriety.  
Patient was administered PRN: Cepacol as ordered, for c/o sore throat, and clonidine for 
increase in anxiety, Robaxin for muscle aches, medications were effective. Patient also 
administered Vistaril at 1845, endorsed to night shift nurse to monitor effective of 
Vistaril. Patient also noted with redness and irritation to left hip with new orders for 
Benadryl 2% cream.  Patient encouraged to attend group therapies/sessions to learn new 
coping skills to prevent relapse, preferred to stay  in room, despite much encouragement, 
denies SI/HI. Patient encouraged adequate PO fluid intake as tolerated. Safety measures in 
place. will continue to monitor. safety measures in place.

## 2017-04-14 NOTE — NUR
MD COMMUNICATION

Magnesium was replaced as ordered by MD for magnesium level of 1.7, will continue to 
monitor.

## 2017-04-14 NOTE — NUR
Start of shift note

Received report from day shift nurse.  Pt is a 30 yo female, A+Ox4, presenting to [facility] for Opiate/ETOH/Benzo/Marijuana dependence.  Pt has Allergies to PCN and Trazodone, 
is Full Code status, and on Soft diet.  Pt is on Fall precautions.  Pt has HX of ADHD, 
Anxiety, Depression, PTSD, Jaw FX, and Polycystic Ovarian Syndrome.  Pt has completed 5 day 
Subutex and Ativan tapers, tolerated well, and ids due for discharge tomorrow.  No s/s of 
distress noted at this time.  Respirations even and unlabored.  Will continue to monitor.

## 2017-04-14 NOTE — NUR
PRN Toradol Reassessment

Medication effective.  Pain is now 3/10.  No s/s of ASE/distress noted at this time.  
Respirations even and unlabored.  Will continue to monitor.

## 2017-04-14 NOTE — NUR
ROBAXIN REASSESSMENT 

Patient reports medication with relief, muscle aches 2/10, tolerable as per patient, will 
conitnue to monitor.

## 2017-04-14 NOTE — NUR
PRN VISTARIL

Patient was administered Vistaril at 1845 d/t increase in anxiety, not relieved by 
pharmacological interventions with monitor effectiveness.

## 2017-04-14 NOTE — NUR
MD COMMUNICATION

Patient reported that she is feeling increase in anxiety, encouraged patient to express 
self, provided with non pharmacological interventions with no relief, Dr. patten with new 
orders for vistaril 50mg po one time now. MD unable to input order, orders was noted and 
carried out and verified with MD will continue to monitor.

## 2017-04-14 NOTE — NUR
PRN CEPACOL/CLONIDINE

Patient report sore throat, and increase in anxiety, provided with non pharmacological 
interventions with no relief, administered PRN: Cepacol lozenge and clonidine as ordered, 
will monitor effectiveness.

## 2017-04-14 NOTE — NUR
End of shift note

Pt is a 30 yo female, A+Ox4, presenting to [facility] for Opiate/ETOH/Benzo/Marijuana 
dependence.  Pt has Allergies to PCN and Trazodone.  Pt is on Fall precautions.  Pt has HX 
of ADHD, Anxiety, Depression, PTSD, Jaw FX, and Polycystic Ovarian Syndrome.  Pt has 
completed 5 day Subutex and Ativan tapers, tolerated well.  Pt was given PRN Zofran @2047, 
PRN Toradol @2215, and PRN Benadryl and Tums @0025.  Pt slept for a total of 5 HRS.  Last 
COWS: 3 and Last CIWA: 1 @0400.  No s/s of distress noted at this time.  Respirations even 
and unlabored.  Will endorse to day shift nurse.

## 2017-04-14 NOTE — NUR
PRN Benadryl and TUMS Reassessment

Medications effective.  Pt is resting well in bed with no s/s of ASE/upset stomach/distress 
noted at this time.  Respirations even and unlabored.  Will continue to monitor.

## 2017-04-14 NOTE — NUR
PRN ROBAXIN

Patient reports muscle aches 6/10 provided with non pharmacological interventions with no 
relief, patient administered PRN Robaxin as ordered, will monitor effectiveness.

## 2017-04-15 VITALS — DIASTOLIC BLOOD PRESSURE: 61 MMHG | SYSTOLIC BLOOD PRESSURE: 103 MMHG

## 2017-04-15 VITALS — SYSTOLIC BLOOD PRESSURE: 93 MMHG | DIASTOLIC BLOOD PRESSURE: 51 MMHG

## 2017-04-15 RX ADMIN — GABAPENTIN SCH MG: 300 CAPSULE ORAL at 09:05

## 2017-04-15 RX ADMIN — DICYCLOMINE HYDROCHLORIDE SCH MG: 20 TABLET ORAL at 09:04

## 2017-04-15 RX ADMIN — PANTOPRAZOLE SODIUM SCH MG: 40 TABLET, DELAYED RELEASE ORAL at 06:51

## 2017-04-15 RX ADMIN — FOLIC ACID SCH MG: 1 TABLET ORAL at 09:04

## 2017-04-15 RX ADMIN — SPIRONOLACTONE SCH MG: 50 TABLET, FILM COATED ORAL at 09:01

## 2017-04-15 RX ADMIN — Medication SCH EA: at 09:03

## 2017-04-15 RX ADMIN — Medication SCH MG: at 09:04

## 2017-04-15 RX ADMIN — THERA TABS SCH UDTAB: TAB at 09:04

## 2017-04-15 RX ADMIN — TOPIRAMATE SCH MG: 100 TABLET, COATED ORAL at 09:04

## 2017-04-15 RX ADMIN — Medication PRN LOZ: at 06:53

## 2017-04-15 NOTE — NUR
PRN Benadryl

Pt c/o inability to sleep and requested for PRN Benadryl.  Medication given and tolerated 
well.  Will reassess within 1 HR.  Will continue to monitor.

## 2017-04-15 NOTE — NUR
End of shift note

Pt is a 30 yo female, A+Ox4, presenting to [facility] for Opiate/ETOH/Benzo/Marijuana 
dependence.  Pt has Allergies to PCN and Trazodone, Full Code status, and on Soft Diet.  Pt 
is on Fall precautions.  Pt has HX of ADHD, Anxiety, Depression, PTSD, Jaw FX, and 
Polycystic Ovarian Syndrome.  Pt has completed 5 day Subutex and Ativan tapers, tolerated 
well, and is due for discharge today.  Pt was given PRN Toradol @2030, PRN Benadryl @0033, 
and PRN cepacol @0655.  Pt slept for a total of 6 HRS.  Last COWS: 1 and Last CIWA: 3 @0400. 
 No s/s of distress noted at this time.  Respirations even and unlabored.  Will endorse to 
day shift nurse.

## 2017-04-15 NOTE — NUR
PRN Benadryl Reassessment

Medication effective.  Pt resting well in bed.  No s/s of ASE/distress noted at this time.  
Respirations even and unlabored.  Will continue to monitor.

## 2017-04-15 NOTE — NUR
PRN Cepacol Lozenge

Pt c/o sore throat and requested for PRN Cepacol Lozenge.  Medication given and tolerated 
well.  Will reassess within 1 HR.  Will continue to monitor.

## 2017-04-15 NOTE — NUR
discharge note: pt left the unit in stable condition no s/s of pain or discomfort or any 
withdrawal symptoms. pt teaching administered and pt verbalized understanding.Pt's V/S WNL. 
all personal belongings returned to pt. pt will be transferred to [facility] 
via private car

## 2017-04-15 NOTE — NUR
start of shift note: received pt from night shift nurse, pt is in stable condition at this 
time no s/s of pain or discomfort. pt is admitted to [facility] for opiate/etoh/benzo 
withdrawal/ dependence. pt is set for discharge today will assist pt in discharging and will 
continue to monitor pt for any changes

## 2017-07-25 ENCOUNTER — HOSPITAL ENCOUNTER (INPATIENT)
Dept: HOSPITAL 12 - SRC | Age: 32
LOS: 7 days | Discharge: HOME | DRG: 895 | End: 2017-08-01
Attending: INTERNAL MEDICINE | Admitting: INTERNAL MEDICINE
Payer: COMMERCIAL

## 2017-07-25 VITALS — HEIGHT: 67 IN | WEIGHT: 120 LBS | BODY MASS INDEX: 18.83 KG/M2

## 2017-07-25 DIAGNOSIS — Z81.8: ICD-10-CM

## 2017-07-25 DIAGNOSIS — Z81.1: ICD-10-CM

## 2017-07-25 DIAGNOSIS — G43.909: ICD-10-CM

## 2017-07-25 DIAGNOSIS — F32.9: ICD-10-CM

## 2017-07-25 DIAGNOSIS — E87.8: ICD-10-CM

## 2017-07-25 DIAGNOSIS — F12.90: ICD-10-CM

## 2017-07-25 DIAGNOSIS — F13.230: ICD-10-CM

## 2017-07-25 DIAGNOSIS — L70.0: ICD-10-CM

## 2017-07-25 DIAGNOSIS — F90.9: ICD-10-CM

## 2017-07-25 DIAGNOSIS — F10.230: Primary | ICD-10-CM

## 2017-07-25 DIAGNOSIS — F17.210: ICD-10-CM

## 2017-07-25 DIAGNOSIS — F41.9: ICD-10-CM

## 2017-07-25 DIAGNOSIS — K70.10: ICD-10-CM

## 2017-07-25 DIAGNOSIS — E86.0: ICD-10-CM

## 2017-07-25 DIAGNOSIS — F11.23: ICD-10-CM

## 2017-07-25 DIAGNOSIS — E28.2: ICD-10-CM

## 2017-07-25 DIAGNOSIS — K59.03: ICD-10-CM

## 2017-07-25 DIAGNOSIS — E87.1: ICD-10-CM

## 2017-07-25 DIAGNOSIS — Z87.81: ICD-10-CM

## 2017-07-25 DIAGNOSIS — E83.42: ICD-10-CM

## 2017-07-25 DIAGNOSIS — J00: ICD-10-CM
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
## 2017-07-25 DIAGNOSIS — F14.20: ICD-10-CM

## 2017-07-25 PROCEDURE — A4663 DIALYSIS BLOOD PRESSURE CUFF: HCPCS

## 2017-07-25 PROCEDURE — G0480 DRUG TEST DEF 1-7 CLASSES: HCPCS

## 2017-07-26 VITALS — DIASTOLIC BLOOD PRESSURE: 82 MMHG | SYSTOLIC BLOOD PRESSURE: 129 MMHG

## 2017-07-26 VITALS — DIASTOLIC BLOOD PRESSURE: 78 MMHG | SYSTOLIC BLOOD PRESSURE: 119 MMHG

## 2017-07-26 VITALS — DIASTOLIC BLOOD PRESSURE: 73 MMHG | SYSTOLIC BLOOD PRESSURE: 110 MMHG

## 2017-07-26 VITALS — SYSTOLIC BLOOD PRESSURE: 132 MMHG | DIASTOLIC BLOOD PRESSURE: 76 MMHG

## 2017-07-26 VITALS — DIASTOLIC BLOOD PRESSURE: 78 MMHG | SYSTOLIC BLOOD PRESSURE: 120 MMHG

## 2017-07-26 VITALS — SYSTOLIC BLOOD PRESSURE: 118 MMHG | DIASTOLIC BLOOD PRESSURE: 76 MMHG

## 2017-07-26 LAB
ALP SERPL-CCNC: 120 U/L (ref 50–136)
ALT SERPL W/O P-5'-P-CCNC: 81 U/L (ref 14–59)
AMPHETAMINES UR QL SCN>1000 NG/ML: NEGATIVE
AMYLASE SERPL-CCNC: 52 U/L (ref 25–115)
AST SERPL-CCNC: 93 U/L (ref 15–37)
BARBITURATES UR QL SCN: NEGATIVE
BASOPHILS # BLD AUTO: 0.1 K/UL (ref 0–8)
BASOPHILS NFR BLD AUTO: 0.9 % (ref 0–2)
BILIRUB SERPL-MCNC: 0.6 MG/DL (ref 0.2–1)
BUN SERPL-MCNC: 9 MG/DL (ref 7–18)
CHLORIDE SERPL-SCNC: 95 MMOL/L (ref 98–107)
CO2 SERPL-SCNC: 31 MMOL/L (ref 21–32)
COCAINE UR QL SCN: POSITIVE
CREAT SERPL-MCNC: 0.6 MG/DL (ref 0.6–1.3)
EOSINOPHIL # BLD AUTO: 0.2 K/UL (ref 0–0.7)
EOSINOPHIL NFR BLD AUTO: 2.2 % (ref 0–7)
ETHANOL SERPL-MCNC: < 3 MG/DL (ref 0–0)
GLUCOSE SERPL-MCNC: 98 MG/DL (ref 74–106)
HCG UR QL: NEGATIVE
HCT VFR BLD AUTO: 38 % (ref 37–47)
HGB BLD-MCNC: 13 G/DL (ref 12–16)
LIPASE SERPL-CCNC: 55 U/L (ref 73–393)
LYMPHOCYTES # BLD AUTO: 2 K/UL (ref 0.8–4.8)
LYMPHOCYTES NFR BLD AUTO: 25.4 % (ref 20.5–51.5)
MAGNESIUM SERPL-MCNC: 1.6 MG/DL (ref 1.8–2.4)
MCH RBC QN AUTO: 32.1 UUG (ref 27–31)
MCHC RBC AUTO-ENTMCNC: 34 G/DL (ref 32–37)
MCV RBC AUTO: 93.7 FL (ref 81–99)
MONOCYTES # BLD AUTO: 0.5 K/UL (ref 0.1–1.3)
MONOCYTES NFR BLD AUTO: 6.2 % (ref 0–11)
NEUTROPHILS # BLD AUTO: 4.9 K/UL (ref 1.8–8.9)
NEUTROPHILS NFR BLD AUTO: 65.3 % (ref 38.5–71.5)
OPIATES UR QL SCN: POSITIVE
PCP UR QL SCN>25 NG/ML: NEGATIVE
PLATELET # BLD AUTO: 292 K/UL (ref 150–450)
POTASSIUM SERPL-SCNC: 4.2 MMOL/L (ref 3.5–5.1)
RBC # BLD AUTO: 4.05 MIL/UL (ref 4.2–5.4)
THC UR QL SCN>50 NG/ML: POSITIVE
TSH SERPL DL<=0.005 MIU/L-ACNC: 0.94 MIU/ML (ref 0.36–3.74)
WBC # BLD AUTO: 7.7 K/UL (ref 4–11.2)
WS STN SPEC: 7.8 G/DL (ref 6.4–8.2)

## 2017-07-26 PROCEDURE — HZ2ZZZZ DETOXIFICATION SERVICES FOR SUBSTANCE ABUSE TREATMENT: ICD-10-PCS | Performed by: INTERNAL MEDICINE

## 2017-07-26 RX ADMIN — BUPRENORPHINE HYDROCHLORIDE SCH MG: 2 TABLET SUBLINGUAL at 16:01

## 2017-07-26 RX ADMIN — ONDANSETRON PRN MG: 4 TABLET, ORALLY DISINTEGRATING ORAL at 21:29

## 2017-07-26 RX ADMIN — GABAPENTIN SCH MG: 300 CAPSULE ORAL at 15:23

## 2017-07-26 RX ADMIN — LORAZEPAM SCH MG: 1 TABLET ORAL at 14:00

## 2017-07-26 RX ADMIN — Medication SCH MG: at 09:57

## 2017-07-26 RX ADMIN — DOXYCYCLINE HYCLATE SCH MG: 100 TABLET, COATED ORAL at 21:29

## 2017-07-26 RX ADMIN — SPIRONOLACTONE SCH MG: 50 TABLET, FILM COATED ORAL at 16:01

## 2017-07-26 RX ADMIN — LORAZEPAM SCH MG: 1 TABLET ORAL at 16:01

## 2017-07-26 RX ADMIN — LORAZEPAM SCH MG: 1 TABLET ORAL at 09:57

## 2017-07-26 RX ADMIN — THERA TABS SCH UDTAB: TAB at 09:57

## 2017-07-26 RX ADMIN — BACITRACIN, NEOMYCIN, POLYMYXIN B SCH GM: 400; 3.5; 5 OINTMENT TOPICAL at 16:00

## 2017-07-26 RX ADMIN — ONDANSETRON PRN MG: 4 TABLET, ORALLY DISINTEGRATING ORAL at 06:43

## 2017-07-26 RX ADMIN — BUPRENORPHINE HYDROCHLORIDE SCH MG: 2 TABLET SUBLINGUAL at 21:24

## 2017-07-26 RX ADMIN — LORAZEPAM SCH MG: 1 TABLET ORAL at 21:00

## 2017-07-26 RX ADMIN — GABAPENTIN SCH MG: 300 CAPSULE ORAL at 21:30

## 2017-07-26 RX ADMIN — Medication PRN MG: at 21:30

## 2017-07-26 RX ADMIN — FOLIC ACID SCH MG: 1 TABLET ORAL at 09:57
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
## 2017-07-26 NOTE — NUR
Vital Signs 

/76, pulse 85, resp 16, SpO2 100% room air, temp 97.9, no reports of pain

COWS/CIWA deferred d/t pt sleeping to assess while pt is awake as ordered. 

Safety measures in place, will continue to monitor.

## 2017-07-26 NOTE — NUR
START OF SHIFT 

Received report from day shift nurse. Pt is lying in bed resting. She is a 31 yo female 
admitted to [facility] on 7/26 for ETOH, BZD, and opiate dependence. She is A&O x3 and 
ambulatory. Allergies to PCNs and Trazodone. She has a PMH of polycystic ovarian syndrome, 
jaw fracture w/ ORIF, PTSD, ADHD, anxiety, and depression. On admission she reported 
drinking vodka 2 750mL bottles per day, heroin 2 grams per day, ativan 1mg per day, and 
cocaine 2 grams per day, marijuana 1 gram per day. She started a 5 day Ativan and 5 day 
Subutex taper today. She reports body aches, runny nose, and headache. Pt has moist skin.  
SpO2 99% on RA. She denies SOB. Tapers due tonight. Fall and seizure precautions ordered. 
Bed is down with call light in reach.

## 2017-07-26 NOTE — NUR
Pre Admission Note  

Patient is a 32-year old female, seen at intake, no SOB, anxiety noted at this time. 
Discussed with patient admission policies of the unit.  Pt appears intoxicated, however is 
able to respond to questions. 

Vital signs taken and as follows: ND=926/82, P=79, O2 sat on RA=96%, RR=16, T=97.9. 

Pt verbalized instructions and teachings regarding disposal of narcotic and other controlled 
home meds, unit protocols such as taking of vital signs Q4H and handling and disposal of 
contraband. Will continue with admission upon pts arrival on the unit.

## 2017-07-26 NOTE — NUR
PRN Milk of Magnesia

Pt reports no BM since prior to admission. Encouraged fluids. PRN Milk of Magnesia 
administered.

## 2017-07-26 NOTE — NUR
Zofran PRN

Pt reports feeling nausea, no emesis episode. 

Zofran 4mg ODT PRN administered. 

Will endorse onto day shift nurse to monitor effectiveness.

## 2017-07-26 NOTE — NUR
IV Insertion

24 gauge IV inserted to right forearm with good blood returned. Flushed and secured. Pt 
tolerated the insertion.
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
## 2017-07-26 NOTE — NUR
PRN REASSESSMENT

Patient is not complaining of nausea after Zofran was given. will continue to monitor.

## 2017-07-26 NOTE — NUR
PRN Zofran and Motrin reassessment

PRN Zofran effective. Pt's nausea is relieved. PRN Motrin effective. Headache is relieved 
and body aches have decreased.

## 2017-07-26 NOTE — NUR
PRN Zofran and Motrin

Pt reports body aches, headache, and nausea. PRN Zofran and Motrin administered.

## 2017-07-26 NOTE — NUR
END OF SHIFT NOTE

Patient was admitted last night. Allergies to Penicillin and Trazodone. Full code. 
Vegetarian diet. Patient was complaining of SOB and chest pain this morning; chest X-Ray 
ordered and MD aware. Patient was seen by Dr. Pelayo. She is on 2L nasal cannula PRN for 
low O2 saturation. Patient is saturating at >95%. Patient started a 4 day Ativan Taper and 
Subutex taper, tolerating well. Urine was collected and labs were drawn.  Vital signs are 
WNL throughout the day. Last CIWA 6 COWS 9. Patient has remained in bed most of the day 
sleeping off and on.  She has been complaint with MD orders.  No PRN meds given. All safety 
measures in place, call light within reach, bed locked and in lowest position. Will continue 
to monitor the patient and endorse to oncoming night nurse.

## 2017-07-26 NOTE — NUR
START OF SHIFT NOTE

Patient is alert and orientated X4. Patient was admitted last night. Patient is complaining 
of SOB this morning with chest pain. Dr. Pelayo notified and stat chest X ray ordered.  
Patient is on 2L O2 with O2 saturation at 95%. patient remains in bed. Urine was collected 
and labs are currently being drawn.  Patients last CIWA 4 COWS 3 according to night nurse. 
Patient didnt sleep well last night. All safety  measures in place. Will continue to monitor 
closely.

## 2017-07-27 VITALS — SYSTOLIC BLOOD PRESSURE: 110 MMHG | DIASTOLIC BLOOD PRESSURE: 73 MMHG

## 2017-07-27 VITALS — SYSTOLIC BLOOD PRESSURE: 126 MMHG | DIASTOLIC BLOOD PRESSURE: 69 MMHG

## 2017-07-27 VITALS — SYSTOLIC BLOOD PRESSURE: 102 MMHG | DIASTOLIC BLOOD PRESSURE: 62 MMHG

## 2017-07-27 VITALS — DIASTOLIC BLOOD PRESSURE: 68 MMHG | SYSTOLIC BLOOD PRESSURE: 109 MMHG

## 2017-07-27 VITALS — SYSTOLIC BLOOD PRESSURE: 125 MMHG | DIASTOLIC BLOOD PRESSURE: 71 MMHG

## 2017-07-27 VITALS — SYSTOLIC BLOOD PRESSURE: 125 MMHG | DIASTOLIC BLOOD PRESSURE: 82 MMHG

## 2017-07-27 PROCEDURE — HZ31ZZZ INDIVIDUAL COUNSELING FOR SUBSTANCE ABUSE TREATMENT, BEHAVIORAL: ICD-10-PCS

## 2017-07-27 RX ADMIN — BACITRACIN, NEOMYCIN, POLYMYXIN B SCH GM: 400; 3.5; 5 OINTMENT TOPICAL at 08:34

## 2017-07-27 RX ADMIN — DOXYCYCLINE HYCLATE SCH MG: 100 TABLET, COATED ORAL at 08:33

## 2017-07-27 RX ADMIN — GABAPENTIN SCH MG: 400 CAPSULE ORAL at 21:59

## 2017-07-27 RX ADMIN — TOPIRAMATE SCH MG: 100 TABLET, COATED ORAL at 21:58

## 2017-07-27 RX ADMIN — METHOCARBAMOL TABLETS PRN MG: 750 TABLET, COATED ORAL at 08:44

## 2017-07-27 RX ADMIN — Medication SCH MG: at 08:33

## 2017-07-27 RX ADMIN — SPIRONOLACTONE SCH MG: 50 TABLET, FILM COATED ORAL at 08:32

## 2017-07-27 RX ADMIN — BACLOFEN SCH MG: 10 TABLET ORAL at 21:58

## 2017-07-27 RX ADMIN — ONDANSETRON PRN MG: 4 TABLET, ORALLY DISINTEGRATING ORAL at 21:59

## 2017-07-27 RX ADMIN — LORAZEPAM SCH MG: 1 TABLET ORAL at 08:33

## 2017-07-27 RX ADMIN — BACITRACIN, NEOMYCIN, POLYMYXIN B SCH GM: 400; 3.5; 5 OINTMENT TOPICAL at 17:21

## 2017-07-27 RX ADMIN — BUPRENORPHINE HYDROCHLORIDE SCH MG: 2 TABLET SUBLINGUAL at 08:33

## 2017-07-27 RX ADMIN — THERA TABS SCH UDTAB: TAB at 08:33

## 2017-07-27 RX ADMIN — FOLIC ACID SCH MG: 1 TABLET ORAL at 08:33

## 2017-07-27 RX ADMIN — GABAPENTIN SCH MG: 300 CAPSULE ORAL at 15:11

## 2017-07-27 RX ADMIN — BUPRENORPHINE HYDROCHLORIDE SCH MG: 2 TABLET SUBLINGUAL at 15:11

## 2017-07-27 RX ADMIN — POLYETHYLENE GLYCOL 3350 PRN GM: 17 POWDER, FOR SOLUTION ORAL at 22:20

## 2017-07-27 RX ADMIN — TOPIRAMATE SCH MG: 100 TABLET, COATED ORAL at 21:00

## 2017-07-27 RX ADMIN — Medication PRN MG: at 06:22

## 2017-07-27 RX ADMIN — LORAZEPAM SCH MG: 1 TABLET ORAL at 21:58

## 2017-07-27 RX ADMIN — LORAZEPAM SCH MG: 1 TABLET ORAL at 15:11

## 2017-07-27 RX ADMIN — BUPRENORPHINE HYDROCHLORIDE SCH MG: 2 TABLET SUBLINGUAL at 21:59

## 2017-07-27 RX ADMIN — GABAPENTIN SCH MG: 300 CAPSULE ORAL at 08:33

## 2017-07-27 RX ADMIN — DOXYCYCLINE HYCLATE SCH MG: 100 TABLET, COATED ORAL at 21:59

## 2017-07-27 RX ADMIN — SPIRONOLACTONE SCH MG: 50 TABLET, FILM COATED ORAL at 17:20

## 2017-07-27 NOTE — NUR
START OF SHIFT



Received report from night nurse. 32 year old female patient admitted on 7/26/17 for ETOH, 
Opiate and Benzo dependence. Pt had hx of polycystic ovarian syndrome, hx of jaw fracture, 
PTSD, ADHD, anxiety and depression. Pt is on Subutex and Ativan taper and is tolerating 
well. Overnight, pt has NS IVF, but IV infiltrated, removed, cold packs applied. Pt 
tolerates fluids at this time. Pt follows a vegetarian diet. Pt has order for PRN oxygen, O2 
is 99% at this time. Pt is not in any acute distress. V/S were wnl throughout shift. PRN 
Motrin x2 and PRN Zofran was administered, and effective. Most recent COWS are 3 and CIWA 2 
at 0000. Pt has bilateral underarm cyst and is receiving PO antibiotics. Pt is compliant 
with treatment plan. All needs met at this time, will continue to monitor.

## 2017-07-27 NOTE — NUR
START OF SHIFT 

Received report from day shift nurse. Pt attended a group meeting and returned to her room 
after. She is a 29 yo female admitted to [facility] on 7/26 for ETOH, BZD, and opiate 
dependence. She is A&O x3 and ambulatory. Allergies to PCNs and Trazodone. She has a PMH of 
polycystic ovarian syndrome, jaw fracture w/ ORIF, PTSD, ADHD, anxiety, and depression. On 
admission she reported drinking vodka 2 750mL bottles per day, heroin 2 grams per day, 
ativan 1mg per day, cocaine 2 grams per day, and marijuana 1 gram per day.  5 day Ativan and 
5  Subutex tapers were started 7/26. She reports body aches, chills, and anxiety. She is 
noted with mild hand tremors. Tapers due tonight. Fall and seizure precautions ordered. Bed 
is down with call light in reach.

## 2017-07-27 NOTE — NUR
0400 CIWA and COWS deferred

CIWA and COWS ordered Q4HWA. Pt is lying in bed resting with eyes closed. Respirations even 
and unlabored. Vital signs obtained. Safety measures in place.

## 2017-07-27 NOTE — NUR
Therapist prompted client to attend daily group sessions, and client stated she would when 
she feels better.

## 2017-07-27 NOTE — NUR
END OF SHIFT



32 year old female patient admitted on 7/26/17 for ETOH, Opiate and Benzo dependence. Pt had 
hx of polycystic ovarian syndrome, hx of jaw fracture, PTSD, ADHD, anxiety and depression. 
Pt is on Subutex and Ativan taper and is tolerating well. IVF discontinued, pt tolerates 
oral intake of fluids adequately. Pt follows a vegetarian diet. Pt has order for PRN oxygen, 
O2 is 99% room air throughout shift. Pt is not in any acute distress. V/S were wnl 
throughout shift. PRN Robaxin was administered and effective. Most recent COWS are 4 and 
CIWA 6 at 1700. Pt has bilateral underarm cyst and is receiving PO antibiotics. Pt is 
compliant with treatment plan. Influenza and Strep swabs done, results are pending. All 
needs met at this time, will continue to monitor.

## 2017-07-27 NOTE — NUR
END OF SHIFT

Report provided to day shift nurse. Pt is lying in bed resting. She is a 29 yo female 
admitted to [facility] on 7/26 for ETOH, BZD, and opiate dependence. She is A&O x3 and 
ambulatory. Allergies to PCNs and Trazodone. Full code and vegetarian diet. She has a PMH of 
polycystic ovarian syndrome, jaw fracture w/ ORIF, PTSD, ADHD, anxiety, and depression. On 
admission she reported drinking vodka 2 750mL bottles per day, heroin 2 grams per day, 
ativan 1mg per day, and cocaine 2 grams per day, marijuana 1 gram per day. She started a 5 
day Ativan and 5 day Subutex taper 7/26. IV to left forearm started with NS running per 
orders. IV became infiltrated. Fluids stopped and IV removed. Ice pack provided and educated 
pt to elevate the arm. Endorsed to day shift for follow up. PRN Motrin x2, Zofran, and MOM 
administered.  Fall and seizure precautions ordered. Bed is down with call light in reach.

## 2017-07-27 NOTE — NUR
PRN Miralax

Pt reports no BM since prior to admission. Encouraged fluids. PRN Miralax administered.

## 2017-07-28 VITALS — SYSTOLIC BLOOD PRESSURE: 96 MMHG | DIASTOLIC BLOOD PRESSURE: 48 MMHG

## 2017-07-28 VITALS — DIASTOLIC BLOOD PRESSURE: 84 MMHG | SYSTOLIC BLOOD PRESSURE: 132 MMHG

## 2017-07-28 VITALS — SYSTOLIC BLOOD PRESSURE: 116 MMHG | DIASTOLIC BLOOD PRESSURE: 80 MMHG

## 2017-07-28 VITALS — SYSTOLIC BLOOD PRESSURE: 115 MMHG | DIASTOLIC BLOOD PRESSURE: 74 MMHG

## 2017-07-28 VITALS — SYSTOLIC BLOOD PRESSURE: 126 MMHG | DIASTOLIC BLOOD PRESSURE: 91 MMHG

## 2017-07-28 VITALS — DIASTOLIC BLOOD PRESSURE: 86 MMHG | SYSTOLIC BLOOD PRESSURE: 109 MMHG

## 2017-07-28 LAB
BUN SERPL-MCNC: 4 MG/DL (ref 7–18)
CHLORIDE SERPL-SCNC: 101 MMOL/L (ref 98–107)
CO2 SERPL-SCNC: 30 MMOL/L (ref 21–32)
CREAT SERPL-MCNC: 0.7 MG/DL (ref 0.6–1.3)
GLUCOSE SERPL-MCNC: 96 MG/DL (ref 74–106)
MAGNESIUM SERPL-MCNC: 2.2 MG/DL (ref 1.8–2.4)
PHOSPHATE SERPL-MCNC: 4.8 MG/DL (ref 2.5–4.9)
POTASSIUM SERPL-SCNC: 4.3 MMOL/L (ref 3.5–5.1)

## 2017-07-28 PROCEDURE — HZ41ZZZ GROUP COUNSELING FOR SUBSTANCE ABUSE TREATMENT, BEHAVIORAL: ICD-10-PCS

## 2017-07-28 RX ADMIN — BACLOFEN SCH MG: 10 TABLET ORAL at 08:34

## 2017-07-28 RX ADMIN — FOLIC ACID SCH MG: 1 TABLET ORAL at 08:34

## 2017-07-28 RX ADMIN — DOXYCYCLINE HYCLATE SCH MG: 100 TABLET, COATED ORAL at 21:51

## 2017-07-28 RX ADMIN — CLONIDINE HYDROCHLORIDE SCH MG: 0.1 TABLET ORAL at 21:50

## 2017-07-28 RX ADMIN — BUPRENORPHINE HYDROCHLORIDE SCH MG: 2 TABLET SUBLINGUAL at 14:42

## 2017-07-28 RX ADMIN — BACLOFEN SCH MG: 10 TABLET ORAL at 14:41

## 2017-07-28 RX ADMIN — BACITRACIN, NEOMYCIN, POLYMYXIN B SCH GM: 400; 3.5; 5 OINTMENT TOPICAL at 08:36

## 2017-07-28 RX ADMIN — GABAPENTIN SCH MG: 400 CAPSULE ORAL at 14:41

## 2017-07-28 RX ADMIN — BACITRACIN, NEOMYCIN, POLYMYXIN B SCH GM: 400; 3.5; 5 OINTMENT TOPICAL at 17:00

## 2017-07-28 RX ADMIN — DOXYCYCLINE HYCLATE SCH MG: 100 TABLET, COATED ORAL at 08:33

## 2017-07-28 RX ADMIN — SPIRONOLACTONE SCH MG: 50 TABLET, FILM COATED ORAL at 08:34

## 2017-07-28 RX ADMIN — LORAZEPAM SCH MG: 1 TABLET ORAL at 17:01

## 2017-07-28 RX ADMIN — TOPIRAMATE SCH MG: 100 TABLET, COATED ORAL at 21:50

## 2017-07-28 RX ADMIN — Medication SCH MG: at 08:34

## 2017-07-28 RX ADMIN — LORAZEPAM SCH MG: 1 TABLET ORAL at 08:34

## 2017-07-28 RX ADMIN — BUPRENORPHINE HYDROCHLORIDE SCH MG: 2 TABLET SUBLINGUAL at 21:51

## 2017-07-28 RX ADMIN — LORAZEPAM SCH MG: 1 TABLET ORAL at 21:51

## 2017-07-28 RX ADMIN — BACLOFEN SCH MG: 10 TABLET ORAL at 21:50

## 2017-07-28 RX ADMIN — SPIRONOLACTONE SCH MG: 50 TABLET, FILM COATED ORAL at 17:01

## 2017-07-28 RX ADMIN — GABAPENTIN SCH MG: 400 CAPSULE ORAL at 08:34

## 2017-07-28 RX ADMIN — THERA TABS SCH UDTAB: TAB at 08:33

## 2017-07-28 RX ADMIN — LORAZEPAM SCH MG: 1 TABLET ORAL at 12:38

## 2017-07-28 NOTE — NUR
BEGINNING OF SHIFT

Patient endorsement report received from night shift nurse, all pertinent information 
discussed. Patient is a 32 year old Female, allergic to penicillins and trazodone. Admitted 
on 7/26/2017. Admitting Dx: ETOH/opiate/BZO dependence.  Patient currently under close 
observation. Ongoing 5 day Ativan and 5 day Subutex taper as ordered, and is currently to 
begin day: 3 of taper.  Patient slept for 7 hours as per night shift. Also with last and 
ciwa score of: 3, and cow score of: 4. Received PRN: Zofran during night shift, medication 
effective as per report.   Received patient alert and oriented x4,  Educated regarding plan 
of care for the day and medication regimen with good verbal understanding. Safety measures 
in place. call light kept with in reach, fall and seizure precautions observed. Will 
continue to monitor closely.

## 2017-07-28 NOTE — NUR
0000 COWS and CIWA deferred

COWS and CIWA ordered Q4HWA. Pt is lying in bed resting with eyes closed. Respirations even 
and unlabored. Vital signs obtained. Safety measures in place.

## 2017-07-28 NOTE — NUR
START OF SHIFT:



Patient is a 32 yrs old female, alert and oriented x4. Patient compliant with therapeutic 
plan of care. Admitting Dx: ETOH/opiate/BZO  dependence, patient continues on 5 day Ativan 
taper and 5 day Subutex taper as ordered.  Last cow 3 and ciwa 3. Patient reports feeling 
anxious and HA. Patient encouraged to attend group/therapy sessions to learn new coping 
skills to prevent relapse. Safety measures in place and call light kept with in reach. Will 
continue to monitor.

## 2017-07-28 NOTE — NUR
END OF SHIFT

Patient alert and oriented x4, vital signs stable during shift. Patient compliant with 
therapeutic plan of care. Admitting Dx: ETOH/opiate/BZO  dependence, Patient continues on 5 
day Ativan taper and 5 day Subutex taper as ordered  is currently on day 3 of tapers.  0900 
assessment patient presented with: c/o chills, difficulty sitting still, dilated pupils, 
mild bone and joint aches, tremors that can be felt but not seen, mild anxiety, barely 
sweating, and mild agitation with cow score of: 6 and ciwa score of: 4; 1300 assessment 
patient presented with: c/o chills, dilated pupils, mild bone and joint aches, tremors that 
can be felt but not seen, mild anxiety, barely sweating, and mild agitation with cow score 
of: 5 and ciwa score of: 4. 1700 assessment patient presented with: c/o chills, dilated 
pupils, mild bone and joint aches, tremors that can be felt but not seen, mild anxiety, 
barely sweating, and mild agitation with cow score of: 5 and ciwa score of: 4. During shift 
administered no PRN medications. Patient encouraged to attend group/therapy sessions to 
learn new coping skills to prevent relapse,  denies SI/HI.  Patient compliant with plan of 
care during shift. Safety measures in place. call light kept with in reach. Patient 
endorsement report given to night shift nurse, all pertinent information discussed. safety 
measures in place. will continue to monitor.

## 2017-07-28 NOTE — NUR
START OF SHIFT 

Received report from day shift nurse. Pt is lying in bed resting. She is a 31 yo female 
admitted to [facility] on 7/26 for ETOH, BZD, and opiate dependence. She is A&O x3 and 
ambulatory. Allergies to PCNs and Trazodone. She has a PMH of polycystic ovarian syndrome, 
jaw fracture w/ ORIF, PTSD, ADHD, anxiety, and depression. On admission she reported 
drinking vodka 2 750mL bottles per day, heroin 2 grams per day, ativan 1mg per day, and 
cocaine 2 grams per day, marijuana 1 gram per day.  5 day Ativan and 5  Subutex tapers were 
started 7/26. PRN Zofran administered. COWS 4 and CIWA 3. She drank 1095 and slept for 7 
hours. Fall and seizure precautions ordered. Bed is down with call light in reach.

## 2017-07-29 VITALS — SYSTOLIC BLOOD PRESSURE: 110 MMHG | DIASTOLIC BLOOD PRESSURE: 67 MMHG

## 2017-07-29 VITALS — DIASTOLIC BLOOD PRESSURE: 76 MMHG | SYSTOLIC BLOOD PRESSURE: 114 MMHG

## 2017-07-29 VITALS — SYSTOLIC BLOOD PRESSURE: 102 MMHG | DIASTOLIC BLOOD PRESSURE: 64 MMHG

## 2017-07-29 VITALS — SYSTOLIC BLOOD PRESSURE: 104 MMHG | DIASTOLIC BLOOD PRESSURE: 62 MMHG

## 2017-07-29 VITALS — DIASTOLIC BLOOD PRESSURE: 71 MMHG | SYSTOLIC BLOOD PRESSURE: 103 MMHG

## 2017-07-29 RX ADMIN — CLONIDINE HYDROCHLORIDE SCH MG: 0.1 TABLET ORAL at 21:21

## 2017-07-29 RX ADMIN — BACLOFEN SCH MG: 10 TABLET ORAL at 21:21

## 2017-07-29 RX ADMIN — DICYCLOMINE HYDROCHLORIDE SCH MG: 20 TABLET ORAL at 21:20

## 2017-07-29 RX ADMIN — CLONIDINE HYDROCHLORIDE SCH MG: 0.1 TABLET ORAL at 08:21

## 2017-07-29 RX ADMIN — SPIRONOLACTONE SCH MG: 50 TABLET, FILM COATED ORAL at 16:24

## 2017-07-29 RX ADMIN — POLYETHYLENE GLYCOL 3350 PRN GM: 17 POWDER, FOR SOLUTION ORAL at 08:32

## 2017-07-29 RX ADMIN — LORAZEPAM SCH MG: 1 TABLET ORAL at 08:21

## 2017-07-29 RX ADMIN — Medication SCH MG: at 08:19

## 2017-07-29 RX ADMIN — DOXYCYCLINE HYCLATE SCH MG: 100 TABLET, COATED ORAL at 21:19

## 2017-07-29 RX ADMIN — Medication PRN MG: at 01:44

## 2017-07-29 RX ADMIN — GABAPENTIN SCH MG: 400 CAPSULE ORAL at 08:20

## 2017-07-29 RX ADMIN — BUPRENORPHINE HYDROCHLORIDE SCH MG: 2 TABLET SUBLINGUAL at 14:20

## 2017-07-29 RX ADMIN — BACITRACIN, NEOMYCIN, POLYMYXIN B SCH INCH: 400; 3.5; 5 OINTMENT TOPICAL at 08:21

## 2017-07-29 RX ADMIN — FOLIC ACID SCH MG: 1 TABLET ORAL at 08:20

## 2017-07-29 RX ADMIN — GABAPENTIN SCH MG: 400 CAPSULE ORAL at 14:20

## 2017-07-29 RX ADMIN — THERA TABS SCH UDTAB: TAB at 08:21

## 2017-07-29 RX ADMIN — HYDROXYZINE PAMOATE PRN MG: 25 CAPSULE ORAL at 17:27

## 2017-07-29 RX ADMIN — GABAPENTIN SCH MG: 400 CAPSULE ORAL at 21:19

## 2017-07-29 RX ADMIN — Medication SCH MG: at 21:21

## 2017-07-29 RX ADMIN — SPIRONOLACTONE SCH MG: 50 TABLET, FILM COATED ORAL at 08:20

## 2017-07-29 RX ADMIN — TOPIRAMATE SCH MG: 100 TABLET, COATED ORAL at 21:20

## 2017-07-29 RX ADMIN — BACITRACIN, NEOMYCIN, POLYMYXIN B SCH INCH: 400; 3.5; 5 OINTMENT TOPICAL at 16:24

## 2017-07-29 RX ADMIN — BACLOFEN SCH MG: 10 TABLET ORAL at 09:00

## 2017-07-29 RX ADMIN — LORAZEPAM SCH MG: 1 TABLET ORAL at 14:20

## 2017-07-29 RX ADMIN — BACLOFEN SCH MG: 10 TABLET ORAL at 14:20

## 2017-07-29 RX ADMIN — LORAZEPAM SCH MG: 1 TABLET ORAL at 21:20

## 2017-07-29 RX ADMIN — BUPRENORPHINE HYDROCHLORIDE SCH MG: 2 TABLET SUBLINGUAL at 21:20

## 2017-07-29 RX ADMIN — DOXYCYCLINE HYCLATE SCH MG: 100 TABLET, COATED ORAL at 08:20

## 2017-07-29 RX ADMIN — BUPRENORPHINE HYDROCHLORIDE SCH MG: 2 TABLET SUBLINGUAL at 08:20

## 2017-07-29 NOTE — NUR
Miralax 17 gm Po given:

Patient complained of mild constipation. Oral fluids encouraged and activity was encouraged. 
Requested for Miralax. PRN Miralax 17 gm PO given as ordered. Will monitor for effectiveness 
throughout the day.

## 2017-07-29 NOTE — NUR
START OF SHIFT

Patient is a 32 yrs old female, admitted for ETOH/opiate/BZO  dependency; patient continues 
on 5 day Ativan taper and 5 day Subutex taper as ordered.  Last COWS=1 and CIWA=2.Pt has 
been compliant with medications,no adverse reactions noted,received in bed in a stable 
condition,no c/o pain or distress noted.All Safety measures in place and call light kept 
with in reach. Will continue to monitor.

## 2017-07-29 NOTE — NUR
END OF SHIFT:



Patient is a 32 yrs old female, alert and oriented x4. Patient compliant with therapeutic 
plan of care. Admitting Dx: ETOH/opiate/BZO  dependence, patient continues on 5 day Ativan 
taper and 5 day Subutex taper as ordered.  Last cow 0 and ciwa 0 Patient reports having a 
headache, gave motrin for relief. Patient slept 6 hours. All pertinent information endorsed 
to am nurse.  Patient will  continue to be monitor.

## 2017-07-29 NOTE — NUR
End of Shift Notes:

Patient continues to be on 5-day Subutex and 5-day Ativan as ordered. No adverse reactions 
noted. VS monitored closely. No significant abnormalitites noted. Withdrawal symptoms were 
closely monitored. Initial COWS 2/CIWA 3. Patient presented with anxiety, mild agitation, 
fine tremors, mild sweats, chills, hot flashes and nasal stuffiness. Last COWS1/CIWA2. 
Patient was given Miralax 17 gm PO as ordered for constipation at 0832 with no result noted 
at this time. Medicated patient with Vistaril 50 mg at 1727 due to complains of anxiety with 
help after 1 hour.  Per patient, Ativan and Subutex has been helping her with her withdrawal 
symptoms. Safety precautionsi n place. Able to participate in group and activity sessions.  
Compliant with care and treatment. All needs met and attended. Will continue to monitor 
closely.

## 2017-07-29 NOTE — NUR
PRN MOTRIN:



Pt complaints of headache, gave Motrin for pain. Reports pain is 7/10. Will reassess for 
effectiveness.

## 2017-07-29 NOTE — NUR
PRN REASSESSMENT:



Patient reports toothache relief, pain 3/10, med was effective. 

-------------------------------------------------------------------------------

Addendum: 07/29/17 at 0529 by EFRAIN CORTEZ RN

-------------------------------------------------------------------------------

Patient reports headache relief, pain 3/10, med was effective.

## 2017-07-29 NOTE — NUR
Vistaril 50 mg PO given:

Patient complained of feeling anxious. Also complained of stuffy nose and watery eyes. 
Redirected patient with non-pharmacological intervention. Medicated patient with Vistaril 50 
mg pO as ordered. Will monitor for effectiveness.

## 2017-07-29 NOTE — NUR
VITAL SIGNS:



Pt is sleeping, no distress noted. 

-------------------------------------------------------------------------------

Addendum: 07/29/17 at 0434 by EFRAIN OCRTEZ RN

-------------------------------------------------------------------------------

Amended: Links added.

## 2017-07-29 NOTE — NUR
Start of Shift Notes:

Received patient in her room. Alert and oriented x 4. Verbally responsive. Able to make need 
known. Respirations even and unlabored. No SOB noted. Skin warm and dry to touch. Abdomen 
soft and non-distended with (+) BS in all 4 quadrants. No complains of N/V/D or constipation 
noted. Bladder non-distended. No complains of bladder pain or discomfort. Voids 
independently. Ambulatory ad brandy with steady gait. Patient is a 32 year old female admitted 
for ETOH, opiate, BZO and cocaine dependence who was placed on a 5-day Ativan and 5-day 
Subutex taper as ordered. No adverse reactions noted. Has past medical hx of PCOS, jaw 
fracture, PTSD, ADHD, anxiety and depression. Prior to admission, patient was using 1.5L of 
Vodka daily, 2 grams of Heroin, 1 mg of Ativan, 2 grams of cocaine and 1 gram of marijuana. 
Has allergies to PCN, and trazodone. FULL CODE. Regular diet. On fall and seizure 
precautions. Educated patient on her current plan of care for the day and her medication 
regimen. Encouraged oral fluid intake and encouraged group participation to learn new skills 
to prevent relapse.

## 2017-07-29 NOTE — NUR
Therapist prompted client to attend daily group therapy sessions. Client stated she would 
try if she was feeling well enough to go.

## 2017-07-30 VITALS — SYSTOLIC BLOOD PRESSURE: 106 MMHG | DIASTOLIC BLOOD PRESSURE: 53 MMHG

## 2017-07-30 VITALS — SYSTOLIC BLOOD PRESSURE: 97 MMHG | DIASTOLIC BLOOD PRESSURE: 68 MMHG

## 2017-07-30 VITALS — SYSTOLIC BLOOD PRESSURE: 98 MMHG | DIASTOLIC BLOOD PRESSURE: 68 MMHG

## 2017-07-30 VITALS — DIASTOLIC BLOOD PRESSURE: 72 MMHG | SYSTOLIC BLOOD PRESSURE: 102 MMHG

## 2017-07-30 VITALS — SYSTOLIC BLOOD PRESSURE: 82 MMHG | DIASTOLIC BLOOD PRESSURE: 50 MMHG

## 2017-07-30 VITALS — DIASTOLIC BLOOD PRESSURE: 52 MMHG | SYSTOLIC BLOOD PRESSURE: 90 MMHG

## 2017-07-30 RX ADMIN — GABAPENTIN SCH MG: 300 CAPSULE ORAL at 21:29

## 2017-07-30 RX ADMIN — DICYCLOMINE HYDROCHLORIDE SCH MG: 20 TABLET ORAL at 21:29

## 2017-07-30 RX ADMIN — Medication SCH MG: at 21:29

## 2017-07-30 RX ADMIN — SPIRONOLACTONE SCH MG: 50 TABLET, FILM COATED ORAL at 17:05

## 2017-07-30 RX ADMIN — LORAZEPAM SCH MG: 1 TABLET ORAL at 15:23

## 2017-07-30 RX ADMIN — BACLOFEN SCH MG: 20 TABLET ORAL at 21:29

## 2017-07-30 RX ADMIN — DOXYCYCLINE HYCLATE SCH MG: 100 TABLET, COATED ORAL at 08:03

## 2017-07-30 RX ADMIN — BUPRENORPHINE HYDROCHLORIDE SCH MG: 2 TABLET SUBLINGUAL at 21:00

## 2017-07-30 RX ADMIN — GABAPENTIN SCH MG: 400 CAPSULE ORAL at 08:04

## 2017-07-30 RX ADMIN — DOXYCYCLINE HYCLATE SCH MG: 100 TABLET, COATED ORAL at 21:29

## 2017-07-30 RX ADMIN — BACLOFEN SCH MG: 10 TABLET ORAL at 08:04

## 2017-07-30 RX ADMIN — GABAPENTIN SCH MG: 300 CAPSULE ORAL at 15:23

## 2017-07-30 RX ADMIN — Medication SCH MG: at 08:04

## 2017-07-30 RX ADMIN — SPIRONOLACTONE SCH MG: 50 TABLET, FILM COATED ORAL at 08:03

## 2017-07-30 RX ADMIN — BACITRACIN, NEOMYCIN, POLYMYXIN B SCH INCH: 400; 3.5; 5 OINTMENT TOPICAL at 16:33

## 2017-07-30 RX ADMIN — THERA TABS SCH UDTAB: TAB at 08:04

## 2017-07-30 RX ADMIN — FOLIC ACID SCH MG: 1 TABLET ORAL at 08:03

## 2017-07-30 RX ADMIN — LORAZEPAM SCH MG: 1 TABLET ORAL at 21:29

## 2017-07-30 RX ADMIN — BACLOFEN SCH MG: 20 TABLET ORAL at 15:23

## 2017-07-30 RX ADMIN — TOPIRAMATE SCH MG: 100 TABLET, COATED ORAL at 21:31

## 2017-07-30 RX ADMIN — BACITRACIN, NEOMYCIN, POLYMYXIN B SCH INCH: 400; 3.5; 5 OINTMENT TOPICAL at 08:08

## 2017-07-30 RX ADMIN — BUPRENORPHINE HYDROCHLORIDE SCH MG: 2 TABLET SUBLINGUAL at 15:22

## 2017-07-30 RX ADMIN — CLONIDINE HYDROCHLORIDE SCH MG: 0.1 TABLET ORAL at 15:00

## 2017-07-30 RX ADMIN — CLONIDINE HYDROCHLORIDE SCH MG: 0.1 TABLET ORAL at 21:00

## 2017-07-30 RX ADMIN — CLONIDINE HYDROCHLORIDE SCH MG: 0.1 TABLET ORAL at 08:04

## 2017-07-30 RX ADMIN — DICYCLOMINE HYDROCHLORIDE SCH MG: 20 TABLET ORAL at 15:23

## 2017-07-30 RX ADMIN — DICYCLOMINE HYDROCHLORIDE SCH MG: 20 TABLET ORAL at 08:03

## 2017-07-30 NOTE — NUR
Start of shift note;



Received report from night nurse. Patient is a 32 year old female admitted on 7/26/17 for 
ETOH/Opiate withdrawals. Patient was placed on 5 day Ativan and 5 day Subutex taper. Patient 
noted to be allergic to PCN and Trazodone. Patient reported history of polycystic ovarian 
syndrome, jaw fracture, PTSD, ADHD, anxiety and depression. Patient slept for 8 hours. 
Patient is currently resting with eyes closed ,respirations even and unlabored. All safety 
measures secured. Will continue to monitor patient.

## 2017-07-30 NOTE — NUR
End of shift note;



Patient is AOX4. Patient is a 32 year old female admitted on 7/26/17 for ETOH/Opiate 
withdrawals. Patient was placed on 5 day Ativan and 5 day Subutex taper. Patient noted to be 
allergic to PCN and Trazodone. Patient reported history of polycystic ovarian syndrome, jaw 
fracture, PTSD, ADHD, anxiety and depression. Patient remained  compliant with treatment 
plan. Medications were effective in reducing withdrawal symptoms. All safety measures 
secured. Met all needs.

## 2017-07-30 NOTE — NUR
START OF SHIFT NOTE 



Pt is a 31 y/o female admitted for ETOH, Heroin, Ativan, Cocaine, and Marijuana dependence 
and use. Pt has an allergy to PCN and Trazodone. Per day shift nurse pt is on a 5 day Ativan 
and Subutex taper and is tolerating medication well with no s.e or a/r reported. Pt didn't 
receive any PRNS during the day shift. Last COW: 3 and CIWA: 2 (1600). At this time pt is 
asleep in room with no signs of discomfort/distress noted. Further assessment will take 
place once the pt is awake. All safety measures in place. Will continue to monitor.

## 2017-07-30 NOTE — NUR
END OF SHIFT

Patient is a 32 yrs old female, admitted for ETOH/opiate/BZO  dependence, patient continues 
on 5 day Ativan taper and 5 day Subutex taper as ordered.  Last COWS=0 and CIWA=1at 0400.Pt 
has been compliant with medications,no adverse reactions noted, Pt is  in a stable 
condition,no c/o pain or distress noted. No PRN meds given last night.Pt slept 8 hrs,fluid 
intake was 1535 mls,voided x 3.All Safety measures in place and call light kept with in 
reach. Will continue to monitor.

## 2017-07-30 NOTE — NUR
NURSING NOTE



Pt's blood pressure at this time is 106/53 and Heart rate is 51 bpm (after 3rd attempt). 
Upon entering the pt's room she was found asleep in bed. Pt did not wake up to the sound of 
her name being called. Pt's breathing was shallow but even and unlabored. Pt had to be 
slightly shaken to arouse her. Pt was a/o x 4, however due to a low blood pressure and heart 
rate scheduled Subutex 2 mg and Clonidine 0.1 mg had to be held. MD notified. No new orders 
given. Pt was informed that medication was being held and that her next scheduled dose was 
at 0900 the next day. Pt verbalized an understanding. Pt was encouraged to notify staff of 
any changes in condition or of any concerns. Pt verbalized an understanding. All safety 
measures in place. Will continue to monitor.

## 2017-07-31 VITALS — DIASTOLIC BLOOD PRESSURE: 66 MMHG | SYSTOLIC BLOOD PRESSURE: 101 MMHG

## 2017-07-31 VITALS — DIASTOLIC BLOOD PRESSURE: 71 MMHG | SYSTOLIC BLOOD PRESSURE: 109 MMHG

## 2017-07-31 VITALS — SYSTOLIC BLOOD PRESSURE: 95 MMHG | DIASTOLIC BLOOD PRESSURE: 64 MMHG

## 2017-07-31 VITALS — DIASTOLIC BLOOD PRESSURE: 82 MMHG | SYSTOLIC BLOOD PRESSURE: 129 MMHG

## 2017-07-31 VITALS — SYSTOLIC BLOOD PRESSURE: 108 MMHG | DIASTOLIC BLOOD PRESSURE: 69 MMHG

## 2017-07-31 LAB
AMPHETAMINES UR QL SCN>1000 NG/ML: NEGATIVE
BARBITURATES UR QL SCN: NEGATIVE
COCAINE UR QL SCN: NEGATIVE
OPIATES UR QL SCN: NEGATIVE
PCP UR QL SCN>25 NG/ML: NEGATIVE
THC UR QL SCN>50 NG/ML: NEGATIVE

## 2017-07-31 RX ADMIN — GABAPENTIN SCH MG: 300 CAPSULE ORAL at 09:14

## 2017-07-31 RX ADMIN — SPIRONOLACTONE SCH MG: 50 TABLET, FILM COATED ORAL at 09:16

## 2017-07-31 RX ADMIN — CLONIDINE HYDROCHLORIDE SCH MG: 0.1 TABLET ORAL at 21:35

## 2017-07-31 RX ADMIN — GABAPENTIN SCH MG: 300 CAPSULE ORAL at 14:21

## 2017-07-31 RX ADMIN — BACLOFEN SCH MG: 20 TABLET ORAL at 09:14

## 2017-07-31 RX ADMIN — THERA TABS SCH UDTAB: TAB at 09:14

## 2017-07-31 RX ADMIN — FOLIC ACID SCH MG: 1 TABLET ORAL at 09:14

## 2017-07-31 RX ADMIN — CLONIDINE HYDROCHLORIDE SCH MG: 0.1 TABLET ORAL at 09:15

## 2017-07-31 RX ADMIN — BACLOFEN SCH MG: 20 TABLET ORAL at 14:21

## 2017-07-31 RX ADMIN — DICYCLOMINE HYDROCHLORIDE SCH MG: 20 TABLET ORAL at 14:21

## 2017-07-31 RX ADMIN — BACLOFEN SCH MG: 20 TABLET ORAL at 21:36

## 2017-07-31 RX ADMIN — METHOCARBAMOL TABLETS PRN MG: 750 TABLET, COATED ORAL at 01:07

## 2017-07-31 RX ADMIN — HYDROXYZINE PAMOATE PRN MG: 25 CAPSULE ORAL at 21:35

## 2017-07-31 RX ADMIN — BACITRACIN, NEOMYCIN, POLYMYXIN B SCH INCH: 400; 3.5; 5 OINTMENT TOPICAL at 16:48

## 2017-07-31 RX ADMIN — BACITRACIN, NEOMYCIN, POLYMYXIN B SCH INCH: 400; 3.5; 5 OINTMENT TOPICAL at 09:17

## 2017-07-31 RX ADMIN — DICYCLOMINE HYDROCHLORIDE SCH MG: 20 TABLET ORAL at 21:36

## 2017-07-31 RX ADMIN — Medication SCH MG: at 09:14

## 2017-07-31 RX ADMIN — Medication SCH MG: at 21:35

## 2017-07-31 RX ADMIN — GABAPENTIN SCH MG: 300 CAPSULE ORAL at 21:34

## 2017-07-31 RX ADMIN — SPIRONOLACTONE SCH MG: 50 TABLET, FILM COATED ORAL at 16:48

## 2017-07-31 RX ADMIN — TOPIRAMATE SCH MG: 100 TABLET, COATED ORAL at 21:36

## 2017-07-31 RX ADMIN — DOXYCYCLINE HYCLATE SCH MG: 100 TABLET, COATED ORAL at 09:14

## 2017-07-31 RX ADMIN — METHOCARBAMOL TABLETS PRN MG: 750 TABLET, COATED ORAL at 21:35

## 2017-07-31 RX ADMIN — CLONIDINE HYDROCHLORIDE SCH MG: 0.1 TABLET ORAL at 14:29

## 2017-07-31 RX ADMIN — HYDROXYZINE PAMOATE PRN MG: 25 CAPSULE ORAL at 01:06

## 2017-07-31 RX ADMIN — DICYCLOMINE HYDROCHLORIDE SCH MG: 20 TABLET ORAL at 09:15

## 2017-07-31 RX ADMIN — Medication PRN MG: at 01:07

## 2017-07-31 NOTE — NUR
ROBAXIN, MOTRIN, AND VISTARIL PRN REASSESSMENT 



Pt is asleep in bed with no signs of pain/discomfort or anxiety noted. Breathing is even and 
unlabored. Respirations are 14 breaths per minute. PRNS effective. All safety measures in 
place. Will continue to monitor.

## 2017-07-31 NOTE — NUR
START OF SHIFT NOTE

Received report from night nurse, 32 year old female admitted on 7/26/17 for ETOH/Opiate 
withdrawals. Pt cont with 5 day Ativan and 5 day Subutex taper. Pt reported PMH of 
polycystic ovarian syndrome, jaw fracture, PTSD, ADHD, anxiety and depression. Per 
endorsement pt received PRN Motrin,Vistaril, Robaxin, slept for 5 hours, and Last CIWA-7, 
COWS-5. Pt is currently resting with eyes closed respirations even and unlabored. All safety 
measures in placed, call light within reach. Will cont to monitor.

## 2017-07-31 NOTE — NUR
END OF SHIFT NOTE 



Pt is a 33 y/o female admitted for ETOH, Heroin, Ativan, Cocaine, and Marijuana dependence 
and use. Pt has an allergy to PCN and Trazodone.  Pt continues on a 5 day Ativan and Subutex 
taper and is tolerating medication well with no s.e or a/r reported. Pt received Robaxin 750 
mg PO PRN, Vistaril 50 mg PO PRN, and Motrin 400 mg PO PRN during the shift. Last COW: 5 and 
CIWA:7  (0000). Pt slept for a total of 5 hours. All safety measures in place. Will endorse 
to the oncoming nurse.

## 2017-07-31 NOTE — NUR
VITALS REFUSED /COW AND CIWA DEFERRED 



Pt refused to have vitals taken at this time. Pt was encouraged x 3 with risks and benefits 
explained, but the pt still refused. COW and CIWA assessment deferred until pt is awake. All 
safety measures in place. Will continue to monitor.  

-------------------------------------------------------------------------------

Addendum: 07/31/17 at 0501 by SWATHI DWYER LVN

-------------------------------------------------------------------------------

Amended: Links added.

## 2017-07-31 NOTE — NUR
CLONIDINE HELD

Pt noted with decreased blood pressure 90/64 Clonidine held as ordered. Will cont to 
monitor.

## 2017-07-31 NOTE — NUR
PRN ROBAXIN/VISTARIL



Pt complains of body aches and anxiety.  PRN Robaxin and Vistaril administered as ordered.  
Breathing even and unlabored, safety measures in place.  Will monitor.
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
## 2017-07-31 NOTE — NUR
ASSUMED CARE

assumed care for client from primary nurse, all pertinent information discussed. will 
continue to monitor closely. safety measures in place.

## 2017-07-31 NOTE — NUR
PRN ROBAXIN/VISTARIL REASSESSMENT



PRN medications effective.  Pt reports decrease in body aches and anxiety.  Breathing even 
and unlabored, safety measures in place.  Will continue to monitor.

## 2017-07-31 NOTE — NUR
END OF SHIFT

Client is a 32 year old female admitted for alcohol, benzodiazepine and heroin withdrawal. 
Client is schedule for discharge tomorrow, urine drug screen not collected. Client continues 
to present with depressed mood, flat affect, last CIWA 2/COWS 2.Client was compliant with 
2/3 of group therapy. Seizure precautions. Adequate intake 2000mL, void x 4. Safety measures 
in place, call light within reach, side rails up x2, bed locked and in low position. 
Endorsed to incoming nurse.

## 2017-07-31 NOTE — NUR
ROBAXIN, MOTRIN, VISTARIL PRN ADMINISTRATION



Pt reported having generalized pain and aches along with anxiety. Robaxin 750 mg PO PRN, 
Motrin 400 mg PO PRN, and Vistaril 50 mg PO PRN was given. Pt was encouraged to notify staff 
of any changes in condition or of any concerns. Pt verbalized an understanding. All safety 
measures in place. Will monitor for effectiveness.

## 2017-08-01 VITALS — DIASTOLIC BLOOD PRESSURE: 53 MMHG | SYSTOLIC BLOOD PRESSURE: 94 MMHG

## 2017-08-01 VITALS — DIASTOLIC BLOOD PRESSURE: 57 MMHG | SYSTOLIC BLOOD PRESSURE: 94 MMHG

## 2017-08-01 VITALS — SYSTOLIC BLOOD PRESSURE: 102 MMHG | DIASTOLIC BLOOD PRESSURE: 70 MMHG

## 2017-08-01 VITALS — DIASTOLIC BLOOD PRESSURE: 73 MMHG | SYSTOLIC BLOOD PRESSURE: 110 MMHG

## 2017-08-01 RX ADMIN — DICYCLOMINE HYDROCHLORIDE SCH MG: 20 TABLET ORAL at 15:30

## 2017-08-01 RX ADMIN — SPIRONOLACTONE SCH MG: 50 TABLET, FILM COATED ORAL at 10:12

## 2017-08-01 RX ADMIN — GABAPENTIN SCH MG: 300 CAPSULE ORAL at 10:11

## 2017-08-01 RX ADMIN — BACLOFEN SCH MG: 20 TABLET ORAL at 15:30

## 2017-08-01 RX ADMIN — DICYCLOMINE HYDROCHLORIDE SCH MG: 20 TABLET ORAL at 10:12

## 2017-08-01 RX ADMIN — GABAPENTIN SCH MG: 300 CAPSULE ORAL at 15:30

## 2017-08-01 RX ADMIN — BACITRACIN, NEOMYCIN, POLYMYXIN B SCH APPLIC: 400; 3.5; 5 OINTMENT TOPICAL at 10:13

## 2017-08-01 RX ADMIN — FOLIC ACID SCH MG: 1 TABLET ORAL at 10:12

## 2017-08-01 RX ADMIN — BACLOFEN SCH MG: 20 TABLET ORAL at 10:12

## 2017-08-01 RX ADMIN — Medication PRN MG: at 01:38

## 2017-08-01 RX ADMIN — THERA TABS SCH UDTAB: TAB at 10:12

## 2017-08-01 RX ADMIN — CLONIDINE HYDROCHLORIDE SCH MG: 0.1 TABLET ORAL at 10:11

## 2017-08-01 RX ADMIN — Medication SCH MG: at 10:12
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
## 2017-08-01 NOTE — NUR
PRN MOTRIN/BENADRYL REASSESSMENT



PRN medications effective.  Pt lying in bed with eyes closed noted to be asleep.  No facial 
grimacing noted. Safety measures in place.  Will monitor.

## 2017-08-01 NOTE — NUR
Client was prompted to attend daily group meetings at 11am and 3:30pm. Client stated that 
she is not feeling up going it at this time.

## 2017-08-01 NOTE — NUR
Vital signs 

Pt reported a pain level of 5/10 to the PCA during vital signs assessment. Upon 
reassessment, pt noted to be sleeping. Will continue to monitor pt and administer medication 
as needed for pain.

## 2017-08-01 NOTE — NUR
PRN MOTRIN/BENADRYL



Pt complains of headache 8/10 and complains of inability to sleep.  PRN Motrin and Benadryl 
administered as ordered.  Breathing even and unlabored, safety measures in place.  Will 
monitor effectiveness.

## 2017-08-01 NOTE — NUR
VITALS REFUSED, COWS/CIWA DEFERRED



0000 vitals refused by pt at beginning of shift.  Pt lying in bed with eyes closed noted to 
be asleep.  COWS, CIWA deferred d/t pt asleep.  Respirations 16,  breathing even and 
unlabored.  Safety measures in place.  Will monitor.

## 2017-08-01 NOTE — NUR
Late administration 

Pt requested meds be administered late so she can get extra sleep. Will continue to monitor 
pt.

## 2017-08-01 NOTE — NUR
VITALS REFUSED, COWS/CIWA DEFERRED



0400 vitals refused by pt at beginning of shift.  Pt lying in bed with eyes closed noted to 
be asleep.  COWS, CIWA deferred d/t pt asleep.  Respirations 16,  breathing even and 
unlabored.  Safety measures in place.  Will continue to monitor.

## 2017-08-01 NOTE — NUR
Discharge Note 

Pt was admitted for ETOH, opiate and benzo dependence. Pt has a recent COWS of 2 and CIWA of 
1. VS are WNL. Pt states that she feels ready for discharge. Pt is refusing to go to 
treatment. Pt states "I have been to almost all of them, I know what I'm doing. I have a 
sober  lined up." Pt verbalized her understanding of the discharge instructions. 

Pt discharge packet, prescriptions, valuables and all belongings returned to pt. Pt ID band 
removed, pt ambulated off of unit with PCA, left facility via [facility] transport for home.

## 2017-08-01 NOTE — NUR
Start of shift note 

Pt was admitted for ETOH, benzo, opiate dependence, cocaine and marijuana abuse. Pt has a 
PMHx of PCOS, ORIF of her jaw, PTSD, ADHD, anxiety and depression. Pt reports an allergy to 
trazodone and PCN. Pt is a full code and she is on a vegetarian regular diet. Pt has 
successfully completed a 5 day ativan and 5 day subutex taper without any ASE. Pt is 
scheduled to discharge today. Pt is currently resting in bed, bed is locked in a low 
position, call light within reach. Will continue to monitor pt. All needs addressed at this 
time.

## 2018-06-13 VITALS — SYSTOLIC BLOOD PRESSURE: 100 MMHG | DIASTOLIC BLOOD PRESSURE: 69 MMHG

## 2018-06-13 NOTE — NUR
Admission



Patient is a 33 year old female who is being admitted for medically supervised withdrawal 
from Opiates- Heroin, Benzos- Xanax, and ETOH-Vodka and Beer. Patient is noted with alcohol 
smell, disheveled, unwashed, and uncombed hair. She is also noted to be intoxicated due to 
last drink noted prior to arrival and well as verbalizing of last use just prior to walking 
into hospital. She is noted with slurred and delayed speech and unsteady gait. She is noted 
to answer questions with one word answers with poor concentration. Patient states that her 
signs and symptoms of withdrawal include "everything, anxiety, chills, body aches, 
irritability, Nausea, vomiting, stomach cramps, insomnia." No history or seizures noted. 



Patient reports her current use as the followin. Heroin, for the past year using 4GM IV daily with last use 18 just prior to 
admission. 



2. ETOH- Vodka drinking for the past year 750mls daily for one year. Patients last drink was 
just prior to arrival 18. 



3. ETOH- Beer 12oz drinking for the past year 12 pack of 12oz cans with last last drink just 
prior to admission. 



4. Xanax- for the past year- taking 4 bars of 2mg bars PO Daily with last use just prior to 
admission. 



Patient states that she is seeking treatment today she states "I need to stop using." She 
also goes on to states that her drug use as caused her to have decreased motivation causing 
her to lose her job and affect her social life. She has been to multiple treatment 
facilities and currently cannot remember the name of the last treatment facility. Patient 
has been able to stay sober for 3 years between the ages of 24 thru 27. Patient verbalizes 
the reason she relapsed is because she was bored and because of her increased cravings. 
Patient is supported by her friends and immediate family and is motivated to seek long term 
care. 



Vital signs noted as 98.1, 100/69, 16, 69, 99%, 0/10. Breathing even and non labored. Lung 
sounds clear with no cough noted. Bowel sounds hypoactive in all 4 quadrants. Skin noted 
intact. She verbalizes allergies to PCN and Trazodone, wishes to be full code, follows a 
regular diet. She is currently unemployed and living with her boyfriend in [place]. 



Past medical history is noted as Anxiety, Depression, PTSD, ADHD, and Polycystic ovarian 
syndrome. No home medications verbalized or noted with patient. 



Patient was educated and encouraged to participate in group therapy and individual therapy. 
All information reviewed with MD with orders placed. Labs to be rendered. PRN Medications 
available for increased signs and symptoms. Admission CIWA and COWS unable to be completed 
due to level of intoxication. Patient was placed on 1:1 for unsteady gait. Will continue 
plan of care as ordered.

## 2018-06-14 ENCOUNTER — HOSPITAL ENCOUNTER (INPATIENT)
Dept: HOSPITAL 12 - SRC | Age: 33
LOS: 5 days | Discharge: NURSING, REHAB OR LONG-TERM CARE | DRG: 895 | End: 2018-06-19
Attending: INTERNAL MEDICINE | Admitting: INTERNAL MEDICINE
Payer: COMMERCIAL

## 2018-06-14 VITALS — SYSTOLIC BLOOD PRESSURE: 109 MMHG | DIASTOLIC BLOOD PRESSURE: 63 MMHG

## 2018-06-14 VITALS — DIASTOLIC BLOOD PRESSURE: 55 MMHG | SYSTOLIC BLOOD PRESSURE: 96 MMHG

## 2018-06-14 VITALS — BODY MASS INDEX: 18.19 KG/M2 | HEIGHT: 68 IN | WEIGHT: 120 LBS

## 2018-06-14 VITALS — SYSTOLIC BLOOD PRESSURE: 133 MMHG | DIASTOLIC BLOOD PRESSURE: 61 MMHG

## 2018-06-14 VITALS — SYSTOLIC BLOOD PRESSURE: 113 MMHG | DIASTOLIC BLOOD PRESSURE: 67 MMHG

## 2018-06-14 DIAGNOSIS — L70.0: ICD-10-CM

## 2018-06-14 DIAGNOSIS — F13.232: ICD-10-CM

## 2018-06-14 DIAGNOSIS — Z88.0: ICD-10-CM

## 2018-06-14 DIAGNOSIS — F90.9: ICD-10-CM

## 2018-06-14 DIAGNOSIS — Z81.8: ICD-10-CM

## 2018-06-14 DIAGNOSIS — E05.90: ICD-10-CM

## 2018-06-14 DIAGNOSIS — F17.210: ICD-10-CM

## 2018-06-14 DIAGNOSIS — F41.9: ICD-10-CM

## 2018-06-14 DIAGNOSIS — F32.9: ICD-10-CM
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
## 2018-06-14 DIAGNOSIS — Z86.74: ICD-10-CM

## 2018-06-14 DIAGNOSIS — G43.909: ICD-10-CM

## 2018-06-14 DIAGNOSIS — I15.9: ICD-10-CM

## 2018-06-14 DIAGNOSIS — E07.81: ICD-10-CM

## 2018-06-14 DIAGNOSIS — F10.232: Primary | ICD-10-CM

## 2018-06-14 DIAGNOSIS — Z81.1: ICD-10-CM

## 2018-06-14 DIAGNOSIS — G47.00: ICD-10-CM

## 2018-06-14 DIAGNOSIS — E28.2: ICD-10-CM

## 2018-06-14 LAB
ALP SERPL-CCNC: 92 U/L (ref 50–136)
ALT SERPL W/O P-5'-P-CCNC: 25 U/L (ref 14–59)
AMPHETAMINES UR QL SCN>1000 NG/ML: NEGATIVE
AMYLASE SERPL-CCNC: 74 U/L (ref 25–115)
AST SERPL-CCNC: 32 U/L (ref 15–37)
BARBITURATES UR QL SCN: NEGATIVE
BASOPHILS # BLD AUTO: 0 K/UL (ref 0–8)
BASOPHILS NFR BLD AUTO: 0.3 % (ref 0–2)
BILIRUB SERPL-MCNC: 0.2 MG/DL (ref 0.2–1)
BUN SERPL-MCNC: 12 MG/DL (ref 7–18)
CHLORIDE SERPL-SCNC: 104 MMOL/L (ref 98–107)
CO2 SERPL-SCNC: 24 MMOL/L (ref 21–32)
COCAINE UR QL SCN: NEGATIVE
CREAT SERPL-MCNC: 0.8 MG/DL (ref 0.6–1.3)
EOSINOPHIL # BLD AUTO: 0 K/UL (ref 0–0.7)
EOSINOPHIL NFR BLD AUTO: 0 % (ref 0–7)
ETHANOL SERPL-MCNC: 260 MG/DL (ref 0–0)
GLUCOSE SERPL-MCNC: 170 MG/DL (ref 74–106)
HCG UR QL: NEGATIVE
HCT VFR BLD AUTO: 42.3 % (ref 31.2–41.9)
HGB BLD-MCNC: 14.5 G/DL (ref 10.9–14.3)
LIPASE SERPL-CCNC: 105 U/L (ref 73–393)
LYMPHOCYTES # BLD AUTO: 0.5 K/UL (ref 20–40)
LYMPHOCYTES NFR BLD AUTO: 6.5 % (ref 20.5–51.5)
MAGNESIUM SERPL-MCNC: 2.1 MG/DL (ref 1.8–2.4)
MCH RBC QN AUTO: 32.2 UUG (ref 24.7–32.8)
MCHC RBC AUTO-ENTMCNC: 34 G/DL (ref 32.3–35.6)
MCV RBC AUTO: 94 FL (ref 75.5–95.3)
MONOCYTES # BLD AUTO: 0 K/UL (ref 2–10)
MONOCYTES NFR BLD AUTO: 0.4 % (ref 0–11)
NEUTROPHILS # BLD AUTO: 6.6 K/UL (ref 1.8–8.9)
NEUTROPHILS NFR BLD AUTO: 92.8 % (ref 38.5–71.5)
OPIATES UR QL SCN: NEGATIVE
PCP UR QL SCN>25 NG/ML: NEGATIVE
PLATELET # BLD AUTO: 268 K/UL (ref 179–408)
POTASSIUM SERPL-SCNC: 3.8 MMOL/L (ref 3.5–5.1)
RBC # BLD AUTO: 4.49 MIL/UL (ref 3.63–4.92)
THC UR QL SCN>50 NG/ML: NEGATIVE
TSH SERPL DL<=0.005 MIU/L-ACNC: 0.35 MIU/ML (ref 0.36–3.74)
WBC # BLD AUTO: 7.2 K/UL (ref 3.8–11.8)
WS STN SPEC: 7.9 G/DL (ref 6.4–8.2)

## 2018-06-14 PROCEDURE — HZ2ZZZZ DETOXIFICATION SERVICES FOR SUBSTANCE ABUSE TREATMENT: ICD-10-PCS | Performed by: INTERNAL MEDICINE

## 2018-06-14 PROCEDURE — G0480 DRUG TEST DEF 1-7 CLASSES: HCPCS

## 2018-06-14 RX ADMIN — BUPRENORPHINE HYDROCHLORIDE SCH MG: 2 TABLET SUBLINGUAL at 12:05

## 2018-06-14 RX ADMIN — LORAZEPAM SCH MG: 1 TABLET ORAL at 17:41

## 2018-06-14 RX ADMIN — METHOCARBAMOL TABLETS PRN MG: 750 TABLET, COATED ORAL at 20:11

## 2018-06-14 RX ADMIN — LORAZEPAM PRN MG: 1 TABLET ORAL at 06:31

## 2018-06-14 RX ADMIN — IBUPROFEN PRN MG: 600 TABLET, FILM COATED ORAL at 20:11

## 2018-06-14 RX ADMIN — LORAZEPAM SCH MG: 1 TABLET ORAL at 20:12

## 2018-06-14 RX ADMIN — DICYCLOMINE HYDROCHLORIDE PRN MG: 20 TABLET ORAL at 20:11

## 2018-06-14 RX ADMIN — POLYETHYLENE GLYCOL 3350 PRN GM: 17 POWDER, FOR SOLUTION ORAL at 20:12

## 2018-06-14 RX ADMIN — GABAPENTIN SCH MG: 300 CAPSULE ORAL at 20:11

## 2018-06-14 RX ADMIN — ACETAMINOPHEN PRN MG: 325 TABLET ORAL at 11:36

## 2018-06-14 RX ADMIN — THERA TABS SCH UDTAB: TAB at 09:00

## 2018-06-14 RX ADMIN — BUPRENORPHINE HYDROCHLORIDE SCH MG: 2 TABLET SUBLINGUAL at 17:42

## 2018-06-14 RX ADMIN — LORAZEPAM SCH MG: 1 TABLET ORAL at 12:03

## 2018-06-14 RX ADMIN — METHOCARBAMOL TABLETS PRN MG: 750 TABLET, COATED ORAL at 11:36

## 2018-06-14 RX ADMIN — ONDANSETRON PRN MG: 4 TABLET, ORALLY DISINTEGRATING ORAL at 20:11

## 2018-06-14 RX ADMIN — ONDANSETRON PRN MG: 4 TABLET, ORALLY DISINTEGRATING ORAL at 11:36

## 2018-06-14 RX ADMIN — DICYCLOMINE HYDROCHLORIDE PRN MG: 20 TABLET ORAL at 11:36

## 2018-06-14 RX ADMIN — BUPRENORPHINE HYDROCHLORIDE SCH MG: 2 TABLET SUBLINGUAL at 20:12

## 2018-06-14 NOTE — NUR
PRN Reassessment

Patient verbalized relief from stomach cramps & nausea & decreased in pain from 7/10 to 
3/10. Patient also complained of relief from headache after medication administration. Pt is 
in bed at this time and appears calm & comfortable. No facial grimacing noted. Safety 
measures in place Will continue to monitor patient.

## 2018-06-14 NOTE — NUR
Reassess PRN Bentyl 20mg PO, Robaxin 750mg PO, Tylenol 650mg PO, Zofran 4mg SL, client 
reports very little relief from abdominal spasms and myalgia on BLE, HA diminished to 3/10, 
but she refused Motrin at this time, and nausea improved. Will continue to monitor. Call 
light within reach.

## 2018-06-14 NOTE — NUR
START OF SHIFT & Reassess PRN Ativan 2mg PO

Endorse rcvd from incoming nurse, client is in bed, lying on her left side, sounds asleep, 
difficult to arouse, RR 16, spO2 @ 99% on RA. 1:1 sitter at bedside promoting safety, client 
was intoxicated upon admission with unsteady gait. Unable to reassess PRN Ativan 2mg, will 
continue to monitor. Last CIWA 17 @ 0634.  Client slept 4 hrs. Seizure precautions in place. 
Bed in lowest/locked position. Side rails x 2 up/padded. Call light within reach.

## 2018-06-14 NOTE — NUR
END OF SHIFT

 Endorse given  to incoming nurse, client is in bed, a/o x 4. Client continues to present 
with depresses mood, flat affect, anxious, irritable, chills, colds, body aches, nausea, 
headache, decreased appetite, and fatigue. Client is not compliant with group therapy. 
Client consumes ~50% meals. Adequate PO fluid 1710mL, void x 3.  Last CIWA 18 / COWS 18 @ 
1700. PRN administered and noted per protocol. Client is not compliant with group therapy. 
Seizure precautions in place. Bed in lowest/locked position. Side rails x 2 up/padded. Call 
light within reach.

## 2018-06-14 NOTE — NUR
PRN Medication Administration 

patient is noted in bed, restless, verbalizing increased anxiety, tremulous, intermittent 
nausea, and increased cravings. patient is verbalizing wanting to leave AMA. She verbalizes 
she will wait to speak with administration but is convinced she will leave later today. She 
states "I'm just not ready to get sober. all I can think about is getting fucked up." CIWA 
noted to be 17. PRN Ativan 2mg administered. Will continue to monitor.

## 2018-06-14 NOTE — NUR
PRN Bentyl 20mg PO, Robaxin 750mg PO, Tylenol 650mg PO, Zofran 4mg SL administered for 
abdominal spasms, myalgia on BLE, HA 7/10, and nausea respectively. Call light within reach.

## 2018-06-14 NOTE — NUR
End of Shift 

Patient is noted in bed sleeping but easily aroused to verbal stimuli. Breathing even and 
non labored. Patient was admitted for medical supervision of Opiate, Benzo, and ETOH 
withdrawal. PRN Medications available for increased signs and symptoms of withdrawal. Skin 
noted intact. Patient received PRN Ativan 2mg for elevated CIWA of 17. She was placed on 1:1 
for unsteady gait. Patient noted to sleep a total of 5 hours. Patient is high risk for AMA. 
All needs attended to promptly. Will endorse to continue plan of care as ordered.

## 2018-06-14 NOTE — NUR
PRN Administration

Pt presented with nausea, stomach cramps, moderately severe headache & 7/10 generalized body 
aches. Pt appears restless & with noted facial grimacing. No active vomiting was noted. PRN 
Bentyl, Motrin, Robaxin & Zofran administered as ordered. Will monitor for effectiveness of 
medications.

## 2018-06-14 NOTE — NUR
Client had one episode of emesis, ginger ale and saltine crackers at bedside. Will continue 
to monitor, too soon to administer second dose of Zofran 4mg SL. Call light within reach.

## 2018-06-14 NOTE — NUR
Clarification of Usage

Patient is noted awake and is able to verbalize correct amount of her substance use:

1. Heroin, relapsing for one year, using 2GM IV Daily. Last use was 6/10/18

2. ETOH-Vodka as stated

3. ETOH- Beer as stated

4. Benzo- Ativan, relapsing for one year, using 1mg TID with last dose noted 6/10/18.

Patient also reported most recent sober-living was [facility] in [place]. 

Relayed to CN. Will inform MD.

## 2018-06-14 NOTE — NUR
Start of Shift Note:

Received patient in bed awake alert & oriented x4. Patient is a 33 y.o female admitted for 
medically supervised withdrawal from Heroin , Ativan & ETOH. Pt appears drowsy, anxious, 
noted with poor eye contact & concentration. Patient presented with restlessness, sweating, 
chills, fine tremors, reports nausea, stomach cramps, constipation, 7/10 headache and 7/10 
body aches. Patient is on 5-day Subutex and 5-day Ativan taper that started today and is 
tolerating well. Pt received PRN Zofran, Bentyl, Tylenol, & Robaxin during day shift and 
were effective per report, Last COWS 18 CIWA 18. Continue to encourage pt to increase fluid 
intake as tolerated for hydration. Will continue to encourage participation in group therapy 
to prevent relapse. Educated patient of current plan of care for the night and medication 
regimen. Safety precaution in place. Bed locked in lowest position. Both side rails up. Call 
light within pt's reach. Will continue to monitor patient.

## 2018-06-15 VITALS — SYSTOLIC BLOOD PRESSURE: 112 MMHG | DIASTOLIC BLOOD PRESSURE: 66 MMHG

## 2018-06-15 VITALS — DIASTOLIC BLOOD PRESSURE: 75 MMHG | SYSTOLIC BLOOD PRESSURE: 142 MMHG

## 2018-06-15 VITALS — DIASTOLIC BLOOD PRESSURE: 77 MMHG | SYSTOLIC BLOOD PRESSURE: 122 MMHG

## 2018-06-15 VITALS — SYSTOLIC BLOOD PRESSURE: 131 MMHG | DIASTOLIC BLOOD PRESSURE: 78 MMHG

## 2018-06-15 VITALS — SYSTOLIC BLOOD PRESSURE: 108 MMHG | DIASTOLIC BLOOD PRESSURE: 82 MMHG

## 2018-06-15 PROCEDURE — HZ51ZZZ INDIVIDUAL PSYCHOTHERAPY FOR SUBSTANCE ABUSE TREATMENT, BEHAVIORAL: ICD-10-PCS

## 2018-06-15 RX ADMIN — LORAZEPAM SCH MG: 1 TABLET ORAL at 08:13

## 2018-06-15 RX ADMIN — POLYETHYLENE GLYCOL 3350 PRN GM: 17 POWDER, FOR SOLUTION ORAL at 17:25

## 2018-06-15 RX ADMIN — METHOCARBAMOL TABLETS PRN MG: 750 TABLET, COATED ORAL at 08:13

## 2018-06-15 RX ADMIN — BUPRENORPHINE HYDROCHLORIDE SCH MG: 2 TABLET SUBLINGUAL at 20:45

## 2018-06-15 RX ADMIN — ONDANSETRON PRN MG: 4 TABLET, ORALLY DISINTEGRATING ORAL at 20:45

## 2018-06-15 RX ADMIN — GABAPENTIN SCH MG: 300 CAPSULE ORAL at 20:45

## 2018-06-15 RX ADMIN — METHOCARBAMOL TABLETS PRN MG: 750 TABLET, COATED ORAL at 20:45

## 2018-06-15 RX ADMIN — BUPRENORPHINE HYDROCHLORIDE SCH MG: 2 TABLET SUBLINGUAL at 14:07

## 2018-06-15 RX ADMIN — Medication SCH MG: at 08:14

## 2018-06-15 RX ADMIN — ACETAMINOPHEN PRN MG: 325 TABLET ORAL at 20:45

## 2018-06-15 RX ADMIN — ACETAMINOPHEN PRN MG: 325 TABLET ORAL at 17:23

## 2018-06-15 RX ADMIN — IBUPROFEN PRN MG: 600 TABLET, FILM COATED ORAL at 20:45

## 2018-06-15 RX ADMIN — GABAPENTIN SCH MG: 300 CAPSULE ORAL at 08:14

## 2018-06-15 RX ADMIN — FOLIC ACID SCH MG: 1 TABLET ORAL at 08:14

## 2018-06-15 RX ADMIN — THERA TABS SCH UDTAB: TAB at 08:14

## 2018-06-15 RX ADMIN — ONDANSETRON PRN MG: 4 TABLET, ORALLY DISINTEGRATING ORAL at 10:06

## 2018-06-15 RX ADMIN — SPIRONOLACTONE SCH MG: 50 TABLET, FILM COATED ORAL at 08:14

## 2018-06-15 RX ADMIN — LORAZEPAM SCH MG: 1 TABLET ORAL at 14:07

## 2018-06-15 RX ADMIN — LORAZEPAM SCH MG: 1 TABLET ORAL at 20:45

## 2018-06-15 RX ADMIN — GABAPENTIN SCH MG: 300 CAPSULE ORAL at 14:07

## 2018-06-15 NOTE — NUR
Re-assessment: Tylenol/Miralax

Patient verbalizes that PRN Tylenol was effective in reducing patient's headache. She states 
that PL is now 2/10. PRN Miralax still with pending results.

## 2018-06-15 NOTE — NUR
PRN TYLENOL/MIRALAX

Patient was administered Tylenol 650mg PO for headache 6/10. Administered Miralax patient 
reports feeling constipated. Will endorse to primary nurse to f/u effectiveness of 
medication.

## 2018-06-15 NOTE — NUR
PRN Reassessment

Patient ambulating in the hallway and appears comfortable with no facial grimacing noted. 
PRN medication effective d/t pt verbalized decreased in pain from 7/10 to 3/10 body aches, 
relief from nausea & relief headache was also noted. Continue to encourage pt to increase 
fluid intake as tolerated. Safety measures in place. Will continue to monitor patient.

## 2018-06-15 NOTE — NUR
MD Communication: CIWA score

Patient's CIWA 16, patient presented with anxiety, agitation, sweating, tremors and 
intermittent nausea. Notified MD Pelayo. Per MD, continue current orders.

## 2018-06-15 NOTE — NUR
End of Shift Note:

Continue to closely monitor patient. Patient still asleep at this time. Pt remains alert & 
oriented x4. Patient remained in her room most of the night. During my shift, she presented 
with restlessness, sweating, chills, fine tremors, reports nausea, stomach cramps, 
constipation, headache and body aches. She is currently on a 5-day Subutex and 5-day Ativan 
taper and tolerating well. Pt was medicated with PRN Bentyl for stomach cramps, Miralax for 
constipation, Motrin for headache, Robaxin for body aches and Zofran for nausea during my 
shift and were all effective. Last COWS 13 CIWA 16. Pt remains compliant with medications. 
Encourage to increase fluid intake as well as tolerated. Vitals were monitored and noted 
WNL. Non pharmacological intervention utilized. Pt slept intermittently for a total of 5 
hours. Fluid intake: 473 ml, Voided 2x with no bowel movement. All needs attended & met. 
Safety measures in place. Will endorse pt to day shift nurse.

## 2018-06-15 NOTE — NUR
Robaxin 750 mg PO given:

Patient noted with complain of 7/10 generalized aching/myalgia related to withdrawal 
symptoms. Medicated patient with Robaxin 750 mg PO as ordered. Will monitor for 
effectiveness.

## 2018-06-15 NOTE — NUR
PRN Administration

Patient complained of moderate severe headache, 7/10 generalized body aches and nausea. 
Patient is in room and appears restless with facial grimacing noted. PRN Motrin, Tylenol, 
Robaxin & Zofran administered as ordered. Will monitor for effectiveness of medication.

## 2018-06-15 NOTE — NUR
Zofran 4 mg SL given:

Patient noted with complain of nausea. No vomiting noted. Patient states "It happened right 
after I smoked." Patient was discouraged from smoking and offered nicotine alternative. 
Patient states she will stay in her room for now and rest. Medicated patient with Zofran 4 
mg SL as ordered. Will monitor for effectiveness.

## 2018-06-15 NOTE — NUR
TB test not administered: 

Patient states she just recently received her TB test with negative results. Patient stated 
that the test was done recently within the last 3 months with negative results. Educated 
patient on the risk and benefits as well as the unit's policies and MD's orders. Patient 
still refused.

## 2018-06-15 NOTE — NUR
Re-assessment: Robaxin

Patient verbalizes that PRN Robaxin was effective in reducing myalgia. PL is now 3/10.

## 2018-06-15 NOTE — NUR
Start of Shift Notes:

Received patient in her room. Awake, alert and oriented x 4. Seen sitting down, eating a 
chocolate pudding with her fingers. Offered to use a spoon. Patient states "No, I like it 
this way." She appears disheveled and room appears messy. Encouraged maintenance of personal 
hygiene and space. Patient is a 33 year old female admitted for opiate/ETOH/BZO withdrawal 
who was placed on a 5-day Subutex and 5-day Ativan taper as ordered. No adverse reactions 
noted. She denies any N/V at this time. She states "I think what is contributing to my 
nausea is cause I'm only wearing one contact and I need to call someone to bring the rest 
in." Patient was placed on the phone call list. Offered to remove her contact but states 
"No, I will deal with it." Educated patient on her current plan of care for the day and her 
medication regimen. Encouraged oral fluid intake and encouraged group participation to learn 
new skills to prevent relapse. PRN Mortin, Robaxin, Zofran, Miralax and Bentyl were given 
during the night. COWS 13/CIWA 16. Slept for 5 hours. Will continue to monitor closely.

## 2018-06-15 NOTE — NUR
End of Shift Notes:

Patient continues to be on 5-day Subutex and 5-day Ativan as ordered. She is currently on 
day 2 of the taper and is tolerating taper well. VS monitored closely. No significant 
abnormalities noted. Withdrawal symptoms were closely monitored. Initial COWs 18/CIWA 16, 
patient presented with anxiety, agitation, gross tremors, sweating, nausea, myalgia, chills, 
hot flashes, fatigue, pupil dilation, yawning and restlessness. Medicated patient with 
Robaxin at 0813 and Zofran at 1006 with help after 1 hour. Last COWS 14/CIWA 13. Patient 
verbalizes that Subutex and Ativan has been effective in reducing her withdrawal symptoms. 
Unable to participate in group and activities due to her withdrawal symptoms. Appetite fair. 
Compliant with care and treatment. All needs met and attended. Will continue to monitor 
closely.

## 2018-06-16 VITALS — DIASTOLIC BLOOD PRESSURE: 64 MMHG | SYSTOLIC BLOOD PRESSURE: 121 MMHG

## 2018-06-16 VITALS — SYSTOLIC BLOOD PRESSURE: 112 MMHG | DIASTOLIC BLOOD PRESSURE: 76 MMHG

## 2018-06-16 VITALS — DIASTOLIC BLOOD PRESSURE: 87 MMHG | SYSTOLIC BLOOD PRESSURE: 124 MMHG

## 2018-06-16 VITALS — DIASTOLIC BLOOD PRESSURE: 88 MMHG | SYSTOLIC BLOOD PRESSURE: 111 MMHG

## 2018-06-16 VITALS — SYSTOLIC BLOOD PRESSURE: 120 MMHG | DIASTOLIC BLOOD PRESSURE: 72 MMHG

## 2018-06-16 RX ADMIN — LORAZEPAM PRN MG: 1 TABLET ORAL at 01:48

## 2018-06-16 RX ADMIN — DICYCLOMINE HYDROCHLORIDE SCH MG: 20 TABLET ORAL at 15:07

## 2018-06-16 RX ADMIN — BUPRENORPHINE HYDROCHLORIDE SCH MG: 2 TABLET SUBLINGUAL at 09:09

## 2018-06-16 RX ADMIN — BUPRENORPHINE HYDROCHLORIDE SCH MG: 2 TABLET SUBLINGUAL at 20:55

## 2018-06-16 RX ADMIN — CLONIDINE HYDROCHLORIDE SCH MG: 0.1 TABLET ORAL at 21:12

## 2018-06-16 RX ADMIN — FOLIC ACID SCH MG: 1 TABLET ORAL at 09:09

## 2018-06-16 RX ADMIN — GABAPENTIN SCH MG: 300 CAPSULE ORAL at 09:08

## 2018-06-16 RX ADMIN — METHOCARBAMOL TABLETS PRN MG: 750 TABLET, COATED ORAL at 20:52

## 2018-06-16 RX ADMIN — BUPRENORPHINE HYDROCHLORIDE SCH MG: 2 TABLET SUBLINGUAL at 15:08

## 2018-06-16 RX ADMIN — METHOCARBAMOL TABLETS PRN MG: 750 TABLET, COATED ORAL at 12:24

## 2018-06-16 RX ADMIN — GABAPENTIN SCH MG: 300 CAPSULE ORAL at 15:07

## 2018-06-16 RX ADMIN — Medication SCH MG: at 09:08

## 2018-06-16 RX ADMIN — LORAZEPAM SCH MG: 1 TABLET ORAL at 09:08

## 2018-06-16 RX ADMIN — SPIRONOLACTONE SCH MG: 50 TABLET, FILM COATED ORAL at 09:09

## 2018-06-16 RX ADMIN — DICYCLOMINE HYDROCHLORIDE SCH MG: 20 TABLET ORAL at 20:52

## 2018-06-16 RX ADMIN — ACETAMINOPHEN PRN MG: 325 TABLET ORAL at 15:07

## 2018-06-16 RX ADMIN — DICYCLOMINE HYDROCHLORIDE PRN MG: 20 TABLET ORAL at 01:48

## 2018-06-16 RX ADMIN — THERA TABS SCH UDTAB: TAB at 09:08

## 2018-06-16 RX ADMIN — IBUPROFEN PRN MG: 600 TABLET, FILM COATED ORAL at 20:52

## 2018-06-16 RX ADMIN — LORAZEPAM SCH MG: 1 TABLET ORAL at 12:24

## 2018-06-16 NOTE — NUR
End of Shift Note:

Continue to closely monitor patient. Patient still asleep at this time. Pt remains alert & 
oriented x4. During my shift, she presented with restlessness, sweating, chills, fine 
tremors, reports nausea, stomach cramps, moderate severe headache and 7/10 body aches. She 
continues to receive her 5-day Subutex and 5-day Ativan taper and tolerating well. Pt was 
medicated with PRN Tylenol, Motrin, Robaxin, Zofran, MOM, Ativan, Bentyl & Clonidine during 
my shift. Last COWS 15 CIWA 16. Pt remains compliant with medications. Encourage to increase 
fluid intake as well as tolerated. Vitals were monitored and noted WNL. Non pharmacological 
intervention utilized. Pt slept for a total of 3 hours. Fluid intake: 500 ml, Voided 1x with 
no bowel movement. All needs attended & met. Safety measures in place. Will endorse pt to 
day shift nurse.

## 2018-06-16 NOTE — NUR
Start of shift

Patient 34 y/o female admitted for medically supervised withdrawal of heroin and ETOH, 
opiates.  Pt was on 5 day Ativan taper and Subutex taper, tolerating well. Patient A&O X4. 
Presents with restlessness, sweating, chills, fine tremors, reports nausea, stomach cramps, 
moderate severe headache and body aches. Last COWS 15, CIWA 16.  Patient is compliant with 
medication regimen.  Encourage pt to attend group therapies/sessions to learn new coping 
skills to prevent relapse. ALLERGIES: PCN, NEOMYCIN, TRAZODONE. FULL CODE. All safety 
precautions in place. Call light within reach. Will continue to monitor for withdrawal 
symptoms.

## 2018-06-16 NOTE — NUR
Start of Shift Note:

Continue to closely monitor patient. She is alert & oriented x4. Pt appears drowsy, anxious, 
noted with poor eye contact & concentration. Patient presented with restlessness, chills, 
fine tremors, reports stomach cramps, moderate severe headache and 8/10 body aches. Patient 
continues on her 5-day Subutex and 5-day Ativan taper and is tolerating well. Pt received 
PRN Tylenol & Robaxin during day shift and were effective per report. Last COWS 14 CIWA 17. 
Continue to encourage pt to increase fluid intake as tolerated for hydration. Will continue 
to encourage participation in group therapy to prevent relapse. Educated patient of current 
plan of care for the night and medication regimen. Safety precaution in place. Bed locked in 
lowest position. Both side rails up. Call light within pt's reach. Will continue to monitor 
patient.

## 2018-06-16 NOTE — NUR
PRN Reassessment

Pt verbalized decreased in pain from 8/10 to 5/10 & slight relief from headache after PRN 
administration. Pt Patient noted in the hallway calm with no facial grimacing noted. Safety 
measures in place. will continue to monitor patient.

## 2018-06-16 NOTE — NUR
End of shift

Patient 32 y/o female admitted for medically supervised withdrawal of heroin and ETOH, 
opiates.  Pt was on 5 day Ativan taper and Subutex taper, tolerating well. PRN given today; 
Robaxin, Tylenol. Patient A&O X4. Presents with restlessness, sweating, chills, fine 
tremors, reports nausea, stomach cramps, moderate severe headache and body aches. Last COWS 
14, CIWA 16.  Patient is compliant with medication regimen.  Encourage pt to attend group 
therapies/sessions to learn new coping skills to prevent relapse. Pt did attend all group 
therapy sessions today. PO fluids 2354ml, voids x3, no BM. ALLERGIES: PCN, NEOMYCIN, 
TRAZODONE. FULL CODE. All safety precautions in place. Call light within reach. Will 
continue to monitor for withdrawal symptoms. Endorsed to PM shift.

## 2018-06-16 NOTE — NUR
PRN Reassessment

Patient in bed with eyes close. Patient appears calm & comfortable with no facial grimacing 
noted. Unable to reassess Ativan at this time. Will reassess if pt awake. safety measures in 
place. Will continue to monitor patient.

## 2018-06-16 NOTE — NUR
PRN Administration

Patient woke up with complaints of stomach cramps, sweating, chills, anxiety, agitation & 
restlessness. CIWA 16 noted. B/P 112/76, LA 66 noted. PRN Ativan 2mg, Clonidine & Bentyl 
administered as ordered. Safety measures in place. Will continue to monitor patient.

## 2018-06-16 NOTE — NUR
PRN Motrin & Robaxin

Patient complained of moderate severe headache & 8/10 generalized body aches. Pt appears 
restless with facial grimacing noted. PRN Motrin & Robaxin administered as ordered. Will 
monitor for effectiveness of medications.

## 2018-06-17 VITALS — SYSTOLIC BLOOD PRESSURE: 132 MMHG | DIASTOLIC BLOOD PRESSURE: 86 MMHG

## 2018-06-17 VITALS — DIASTOLIC BLOOD PRESSURE: 68 MMHG | SYSTOLIC BLOOD PRESSURE: 105 MMHG

## 2018-06-17 VITALS — SYSTOLIC BLOOD PRESSURE: 97 MMHG | DIASTOLIC BLOOD PRESSURE: 66 MMHG

## 2018-06-17 VITALS — DIASTOLIC BLOOD PRESSURE: 62 MMHG | SYSTOLIC BLOOD PRESSURE: 104 MMHG

## 2018-06-17 VITALS — DIASTOLIC BLOOD PRESSURE: 61 MMHG | SYSTOLIC BLOOD PRESSURE: 94 MMHG

## 2018-06-17 RX ADMIN — IBUPROFEN PRN MG: 600 TABLET, FILM COATED ORAL at 08:36

## 2018-06-17 RX ADMIN — CLONIDINE HYDROCHLORIDE SCH MG: 0.1 TABLET ORAL at 08:37

## 2018-06-17 RX ADMIN — THERA TABS SCH UDTAB: TAB at 08:36

## 2018-06-17 RX ADMIN — DICYCLOMINE HYDROCHLORIDE SCH MG: 20 TABLET ORAL at 08:36

## 2018-06-17 RX ADMIN — LORAZEPAM SCH MG: 1 TABLET ORAL at 20:30

## 2018-06-17 RX ADMIN — MAGNESIUM HYDROXIDE PRN ML: 400 SUSPENSION ORAL at 23:08

## 2018-06-17 RX ADMIN — DICYCLOMINE HYDROCHLORIDE SCH MG: 20 TABLET ORAL at 20:30

## 2018-06-17 RX ADMIN — DICYCLOMINE HYDROCHLORIDE SCH MG: 20 TABLET ORAL at 14:07

## 2018-06-17 RX ADMIN — Medication SCH MG: at 09:38

## 2018-06-17 RX ADMIN — METHOCARBAMOL TABLETS PRN MG: 750 TABLET, COATED ORAL at 08:36

## 2018-06-17 RX ADMIN — BUPRENORPHINE HYDROCHLORIDE SCH MG: 2 TABLET SUBLINGUAL at 20:31

## 2018-06-17 RX ADMIN — LORAZEPAM SCH MG: 1 TABLET ORAL at 14:06

## 2018-06-17 RX ADMIN — SPIRONOLACTONE SCH MG: 50 TABLET, FILM COATED ORAL at 08:35

## 2018-06-17 RX ADMIN — ONDANSETRON PRN MG: 4 TABLET, ORALLY DISINTEGRATING ORAL at 20:30

## 2018-06-17 RX ADMIN — METHOCARBAMOL TABLETS PRN MG: 750 TABLET, COATED ORAL at 20:30

## 2018-06-17 RX ADMIN — GABAPENTIN SCH MG: 300 CAPSULE ORAL at 08:36

## 2018-06-17 RX ADMIN — GABAPENTIN SCH MG: 300 CAPSULE ORAL at 14:07

## 2018-06-17 RX ADMIN — Medication SCH MG: at 14:10

## 2018-06-17 RX ADMIN — BACLOFEN SCH MG: 10 TABLET ORAL at 20:30

## 2018-06-17 RX ADMIN — GABAPENTIN SCH MG: 300 CAPSULE ORAL at 20:30

## 2018-06-17 RX ADMIN — FOLIC ACID SCH MG: 1 TABLET ORAL at 08:37

## 2018-06-17 RX ADMIN — LORAZEPAM SCH MG: 1 TABLET ORAL at 08:36

## 2018-06-17 RX ADMIN — CLONIDINE HYDROCHLORIDE SCH MG: 0.1 TABLET ORAL at 20:30

## 2018-06-17 RX ADMIN — BUPRENORPHINE HYDROCHLORIDE SCH MG: 2 TABLET SUBLINGUAL at 08:36

## 2018-06-17 RX ADMIN — IBUPROFEN PRN MG: 600 TABLET, FILM COATED ORAL at 23:08

## 2018-06-17 NOTE — NUR
START OF SHIFT



Pt is a 34 y/o female admitted on 06/14/18 for ETOH, benzo and opiate withdrawl. Pt is on a 
5 day Ativan and Subutex taper, tolerating well. Last COWS 12 and CIWA 12 and no PRNs 
administered. Upon assessment pt presents with anxiety, agitation, difficulty thinking 
clearly, difficulty concentrating, flushed skin, sweats, chills, stomach cramps, nausea 
without vomiting, restlessness, difficulty falling asleep and staying asleep, body aches, 
headache, constipation, photosensitivity, irritability, disheveled appearance, and unkempt 
room. Pt is refusing side rail padding, educated risks and pt verbalized understanding. 
Medications due. Safety measures in place. Call light within reach. Will continue to 
monitor.

## 2018-06-17 NOTE — NUR
Start of shift

Patient 32 y/o female admitted for medically supervised withdrawal of heroin and ETOH, 
opiates.  Pt is on a 5 day Ativan taper and Subutex taper, tolerating well. Patient A&O X4. 
Presents with restlessness, sweating, chills, fine tremors, reports nausea, stomach cramps, 
moderate severe headache and body aches. Last COWS 10, CIWA 13.  Patient is compliant with 
medication regimen.  Encourage pt to attend group therapies/sessions to learn new coping 
skills to prevent relapse. ALLERGIES: PCN, NEOMYCIN, TRAZODONE. FULL CODE. All safety 
precautions in place. Call light within reach. Will continue to monitor for withdrawal 
symptoms.

## 2018-06-17 NOTE — NUR
PRN ROBAXIN REASSESSMENT



Pt reports body aches improved to tolerable level. Safety measures in place. Call light 
within reach. Will continue to monitor.

## 2018-06-17 NOTE — NUR
Vitals/COWS/CIWA Deferred 

Patient in bed with eyes close. Patient requested not to be woken up for vitals at this 
time. Respiration even & unlabored. RR 16. Vitals/COWS/CIWA deferred to assess if pt is 
awake as ordered. Safety measures in place. Will continue to monitor patient.

## 2018-06-17 NOTE — NUR
PRN MILK OF MAG AND MOTRIN ADMINISTRATION



Pt reports constipation and headache. Safety measures in place. Call light within reach. 
Will continue to monitor.

## 2018-06-17 NOTE — NUR
PRN ROBAXIN AND ZOFRAN ODT ADMINISTRATION



Pt reports body aches 6/10 and nausea without vomiting. Safety measures in place. Call light 
within reach. Will continue to monitor.

## 2018-06-17 NOTE — NUR
End of Shift Note:

Continue to closely monitor patient. Patient still asleep at this time. Pt remains alert & 
oriented x4. During my shift, she presented with restlessness, chills, fine tremors, reports 
stomach cramps, moderate severe headache and 8/10 body aches. She continues to receive her 
5-day Subutex and 5-day Ativan taper with no problems. Pt was medicated with PRN Motrin, 
Robaxin, Ativan, & benadryl during my shift. Last COWS 10 CIWA 13. Pt remains compliant with 
medications. Encourage to increase fluid intake as well as tolerated. Vitals were monitored 
and noted WNL. Non pharmacological intervention utilized. Pt slept for a total of 6 hours. 
Fluid intake: 750 ml, Voided 2x with no bowel movement. All needs attended & met. Safety 
measures in place. Will endorse pt to day shift nurse.

## 2018-06-17 NOTE — NUR
PRN Ativan & Benadryl

Patient complains that she can't sleep and she is really anxious. Pt verbalized 
restlessness, irritability & mild headache. CIWA 10 noted at this time. PRN Ativan 1mg and 
Benadryl administered as ordered. Safety measures in place. Will monitor for effectiveness 
of medications.

## 2018-06-17 NOTE — NUR
OLYA DAILEY ODT REASSESSMENT



Pt reports nausea ceased, able to tolerable fluids and snacks. Safety measures in place. 
Call light within reach. Will continue to monitor.

## 2018-06-17 NOTE — NUR
PRN Reassessment

Pt in bed with eyes close. Pt appears calm & comfortable in bed. no shortness of breath 
noted. Unable to assess CIWA at this time. Safety measures in place. Will continue to 
monitor patient.

## 2018-06-17 NOTE — NUR
END OF SHIFT NOTE

Patient 33 year old female admitted for medically supervised withdrawal of heroin and ETOH, 
opiates.  Pt is on a 5 day Ativan taper and Subutex taper, tolerating well. Patient A&O X4. 
Presents with restlessness, sweating, chills, fine tremors, reports nausea, stomach cramps, 
moderate severe headache and body aches. Patient received PRN Robaxin and Motrin from 
primary nurse. Received care at 1305. Pt room is cluttered with empty bottles, wrappers, and 
food spilled on the floor. Pt is disheveled and odorous. Patient is compliant with 
medication regimen.  Encourage pt to attend group therapies/sessions to learn new coping 
skills to prevent relapse. All safety measures in place, call light within reach. Endorse pt 
to night nurse in stable condition.

## 2018-06-17 NOTE — NUR
RECEIVED CARE

Received patient from primary nurse. All pertinent information discussed. Patient is in 
stable condition.

## 2018-06-18 VITALS — DIASTOLIC BLOOD PRESSURE: 61 MMHG | SYSTOLIC BLOOD PRESSURE: 104 MMHG

## 2018-06-18 VITALS — DIASTOLIC BLOOD PRESSURE: 76 MMHG | SYSTOLIC BLOOD PRESSURE: 108 MMHG

## 2018-06-18 VITALS — SYSTOLIC BLOOD PRESSURE: 102 MMHG | DIASTOLIC BLOOD PRESSURE: 55 MMHG

## 2018-06-18 VITALS — SYSTOLIC BLOOD PRESSURE: 121 MMHG | DIASTOLIC BLOOD PRESSURE: 52 MMHG

## 2018-06-18 VITALS — DIASTOLIC BLOOD PRESSURE: 55 MMHG | SYSTOLIC BLOOD PRESSURE: 94 MMHG

## 2018-06-18 RX ADMIN — METHOCARBAMOL TABLETS PRN MG: 750 TABLET, COATED ORAL at 15:37

## 2018-06-18 RX ADMIN — CLONIDINE HYDROCHLORIDE SCH MG: 0.1 TABLET ORAL at 08:48

## 2018-06-18 RX ADMIN — IBUPROFEN PRN MG: 600 TABLET, FILM COATED ORAL at 08:47

## 2018-06-18 RX ADMIN — ONDANSETRON PRN MG: 4 TABLET, ORALLY DISINTEGRATING ORAL at 15:54

## 2018-06-18 RX ADMIN — CLONIDINE HYDROCHLORIDE SCH MG: 0.1 TABLET ORAL at 22:07

## 2018-06-18 RX ADMIN — BACLOFEN SCH MG: 10 TABLET ORAL at 15:33

## 2018-06-18 RX ADMIN — DICYCLOMINE HYDROCHLORIDE SCH MG: 20 TABLET ORAL at 22:07

## 2018-06-18 RX ADMIN — KETOROLAC TROMETHAMINE PRN MG: 30 INJECTION, SOLUTION INTRAMUSCULAR; INTRAVENOUS at 15:34

## 2018-06-18 RX ADMIN — GABAPENTIN SCH MG: 300 CAPSULE ORAL at 08:46

## 2018-06-18 RX ADMIN — KETOROLAC TROMETHAMINE PRN MG: 30 INJECTION, SOLUTION INTRAMUSCULAR; INTRAVENOUS at 22:07

## 2018-06-18 RX ADMIN — THERA TABS SCH UDTAB: TAB at 08:46

## 2018-06-18 RX ADMIN — Medication SCH MG: at 08:48

## 2018-06-18 RX ADMIN — Medication SCH MG: at 08:47

## 2018-06-18 RX ADMIN — BACLOFEN SCH MG: 10 TABLET ORAL at 22:07

## 2018-06-18 RX ADMIN — SPIRONOLACTONE SCH MG: 50 TABLET, FILM COATED ORAL at 08:46

## 2018-06-18 RX ADMIN — CLONIDINE HYDROCHLORIDE SCH MG: 0.1 TABLET ORAL at 15:00

## 2018-06-18 RX ADMIN — DICYCLOMINE HYDROCHLORIDE SCH MG: 20 TABLET ORAL at 08:46

## 2018-06-18 RX ADMIN — BACLOFEN SCH MG: 10 TABLET ORAL at 08:47

## 2018-06-18 RX ADMIN — MAGNESIUM HYDROXIDE PRN ML: 400 SUSPENSION ORAL at 22:14

## 2018-06-18 RX ADMIN — GABAPENTIN SCH MG: 300 CAPSULE ORAL at 22:07

## 2018-06-18 RX ADMIN — GABAPENTIN SCH MG: 300 CAPSULE ORAL at 15:33

## 2018-06-18 RX ADMIN — DICYCLOMINE HYDROCHLORIDE SCH MG: 20 TABLET ORAL at 15:33

## 2018-06-18 RX ADMIN — FOLIC ACID SCH MG: 1 TABLET ORAL at 08:46

## 2018-06-18 NOTE — NUR
START OF SHIFT



PT IS A 34 Y/O F ADMITTED ON 06/14/18 FOR MEDICALLY SUPERVISED ETOH, BENZO AND OPIATE 
WITHDRAWAL. PT IS PLACED ON AN 5 DAY ATIVAN AND SUBUTEX TAPER, TODAY BEING THE LAST DAY. 
LAST COWS 13 AND CIWA 16, IBUPROFEN, ROBAXIN, ZOFRAN, AND MOM PRNS GIVEN LAST NIGHT. PT 
SLEPT 6 HRS. RECEIVED PT A/OX4, RESPIRATIONS EVEN AND UNLABORED. PT APPEARS DISHEVELED AND 
ROOM IS UNKEMPT, PT PRESENTS ANXIETY, AGITATION, IRRITABILITY, RESTLESSNESS, HEADACHE, 
INTERMITTENT CHILLS AND SWEATING, CLAMMY SKIN, GENERALIZED BODY ACHES, FATIGUE, DYSPHORIA, 
ANHEDONIA, CONSTIPATION, STOMACH CRAMPS, NAUSEA, DIFFICULTY THINKING AND CONCENTRATING, AND 
TREMORS ARE NOTED. EDUCATED PT WITH TODAY'S PLAN OF CARE AND MED REGIMEN AND ENCOURAGED PT 
TO ATTEND GROUPS TO LEARN SKILLS TO MAINTAIN SOBRIETY. SIDE RAILS UPX2, BED IN LOW POSITION. 
CALL LIGHT WITHIN REACH. SAFETY MEASURES IN PLACE. WILL CONTINUE TO MONITOR.

## 2018-06-18 NOTE — NUR
PRN TORADOL INJ AND MILK OF MAG ADMINISTRATION



Pt reports pain 9/10 generalized body pain and headache. Pt reports no BM "in a few days." 
Safety measures in place. Call light within reach. Will continue to monitor.

## 2018-06-18 NOTE — NUR
PRN MILK OF MAG AND MOTRIN REASSESSMENT



No BM at this time, will continue to monitor. Pt reports headache has ceased. Safety 
measures in place. Call light within reach. Will continue to monitor.

## 2018-06-18 NOTE — NUR
PRN MILK OF MAG REASSESSMENT



Pt reports no BM at this time, will continue to monitor. Safety measures in place. Call 
light within reach.

## 2018-06-18 NOTE — NUR
COWS/CIWA DEFERRED AND VITALS REFUSED



Pt laying in bed with eyes closed, COWS/CIWA deferred, to be assessed when pt is awake per 
orders. Vitals refused. Respirations even and unlabored. Safety measures in place. Call 
light within reach. Will continue to monitor.

## 2018-06-18 NOTE — NUR
REASSESSMENT



PT IS IN ROOM DOING YOGA POSES ON THE FLOOR WITH A TOWEL. PT REPORTS IBUPROFEN WAS EFFECTIVE 
AND PAIN HAS DECREASED TO A 5/10.

## 2018-06-18 NOTE — NUR
PRN TORADOL INJ REASSESSMENT



Pt reports pain improved to 4/10. Safety measures in place. Call light within reach. Will 
continue to monitor.

## 2018-06-18 NOTE — NUR
END OF SHIFT



Pt is a 32 y/o female admitted on 06/14/18 for ETOH, benzo and opiate withdrawl. Pt is on a 
5 day Ativan and Subutex taper, tolerating well. Pt presented with anxiety, agitation, 
difficulty thinking clearly, difficulty concentrating, flushed skin, sweats, chills, stomach 
cramps, nausea without vomiting, restlessness, difficulty falling asleep and staying asleep, 
body aches, headache, constipation, photosensitivity, irritability, disheveled appearance, 
and unkempt room. Pt is refusing side rail padding, educated risks and pt verbalized 
understanding. Pt verbalized feelings of AMA during shift r/t difficulty coping with 
negative emotions and cravings. Scheduled medications and PRN Robaxin, Zofran odt, Milk of 
Magnesium and Motrin administered, effective in S/S of withdrawal AEB COWS 14 and CIWA 23 
lowered to COWS 13 and CIWA 16 during shift. Pt slept 6 hours. Intake 973 ml, void x 2, 
stool x 0. Safety measures in place. Call light within reach. Pts needs have been met. 
Endorsed to day shift nurse.

## 2018-06-18 NOTE — NUR
PRN 



IBUPROFEN 600 MG PO PRN GIVEN FOR C/O HEADACHE AND GENERALIZED BODY ACHES 8/10 WITH FACIAL 
GRIMACING AND IRRITABILITY. WILL MONITOR AND REASSESS.

## 2018-06-18 NOTE — NUR
START OF SHIFT



Pt is a 34 y/o female admitted on 06/14/18 for ETOH, benzo and opiate withdrawal. Pt 
finished her 5 day Ativan and 5 day Subutex taper and is scheduled to be d/c tomorrow. Last 
COWS 12 and CIWA 14 and PRN Motrin, Toradol, Zofran and Robaxin administered during day 
shift. Upon assessment pt presents with anxiety, restlessness, agitation, headache, sweats, 
chills, nausea without vomiting, difficulty thinking clearly, difficulty concentrating, poor 
memory recall, difficulty falling and staying asleep, disheveled appearance, racing 
thoughts, loose thoughts, body aches, headache, constipation, flat affect, unkempt room and 
is withdrawn. Medications due. Safety measures in place. Call light within reach. Will 
continue to monitor. 

-------------------------------------------------------------------------------

Addendum: 06/19/18 at 0011 by ANNAMARIA COLÓN RN

-------------------------------------------------------------------------------

Additional: Pt refuses side rail pads, provided education about risks, pt verbalized 
understanding.

## 2018-06-18 NOTE — NUR
END OF SHIFT



PT HAS NOT ATTENDED GROUPS DURING SHIFT; PT STATES SHE HAS NOT BEEN FEELING WELL ENOUGH TO 
PARTICIPATE IN GROUPS AND ACTIVITIES AT THIS TIME. PT HAS BEEN GIVEN TORADOL, ZOFRAN, AND 
IBUPROFEN PRNS DURING SHIFT. LAST COWS 12 AND CIWA 14. ATE 50/75/75% OF MEALS. FLUID INTAKE 
1750 ML, VOIDED X3, BM 0. SAFETY MEASURES IN PLACE. ENDORSEMENT GIVEN TO NIGHT SHIFT NURSE.

## 2018-06-19 VITALS — SYSTOLIC BLOOD PRESSURE: 96 MMHG | DIASTOLIC BLOOD PRESSURE: 61 MMHG

## 2018-06-19 VITALS — DIASTOLIC BLOOD PRESSURE: 55 MMHG | SYSTOLIC BLOOD PRESSURE: 88 MMHG

## 2018-06-19 VITALS — DIASTOLIC BLOOD PRESSURE: 52 MMHG | SYSTOLIC BLOOD PRESSURE: 89 MMHG

## 2018-06-19 RX ADMIN — KETOROLAC TROMETHAMINE PRN MG: 30 INJECTION, SOLUTION INTRAMUSCULAR; INTRAVENOUS at 15:04

## 2018-06-19 RX ADMIN — SPIRONOLACTONE SCH MG: 50 TABLET, FILM COATED ORAL at 09:38

## 2018-06-19 RX ADMIN — DICYCLOMINE HYDROCHLORIDE SCH MG: 20 TABLET ORAL at 09:38

## 2018-06-19 RX ADMIN — Medication SCH MG: at 09:38

## 2018-06-19 RX ADMIN — POLYETHYLENE GLYCOL 3350 PRN GM: 17 POWDER, FOR SOLUTION ORAL at 15:04

## 2018-06-19 RX ADMIN — BACLOFEN SCH MG: 10 TABLET ORAL at 14:56

## 2018-06-19 RX ADMIN — CLONIDINE HYDROCHLORIDE SCH MG: 0.1 TABLET ORAL at 14:56

## 2018-06-19 RX ADMIN — DICYCLOMINE HYDROCHLORIDE SCH MG: 20 TABLET ORAL at 14:56

## 2018-06-19 RX ADMIN — BACLOFEN SCH MG: 10 TABLET ORAL at 09:38

## 2018-06-19 RX ADMIN — GABAPENTIN SCH MG: 300 CAPSULE ORAL at 14:56

## 2018-06-19 RX ADMIN — CLONIDINE HYDROCHLORIDE SCH MG: 0.1 TABLET ORAL at 09:00

## 2018-06-19 RX ADMIN — FOLIC ACID SCH MG: 1 TABLET ORAL at 09:38

## 2018-06-19 RX ADMIN — KETOROLAC TROMETHAMINE PRN MG: 30 INJECTION, SOLUTION INTRAMUSCULAR; INTRAVENOUS at 06:53

## 2018-06-19 RX ADMIN — THERA TABS SCH UDTAB: TAB at 09:38

## 2018-06-19 RX ADMIN — GABAPENTIN SCH MG: 300 CAPSULE ORAL at 09:38

## 2018-06-19 NOTE — NUR
PRN



TORADOL 30 MG IM AND MIRALAX PRNS GIVEN; PT STATED SHE WANTED TORADOL IN CONJUNCTION TO THE 
BACLOFEN AND STATED IT WORKS BETTER FOR HER. PT HAS TOLERATED IM INJECTION WELL.

## 2018-06-19 NOTE — NUR
PRN TORADOL INJ ADMINISTRATION



Pt reports generalized body pain and headache 8/10. Safety measures in place. Call light 
within reach. Will continue to monitor.

## 2018-06-19 NOTE — NUR
PRN ASHLIE ODT REASSESSMENT



Pt reports nausea improved, able to tolerable fluids. Safety measures in place. Call light 
within reach. Will continue to monitor.

## 2018-06-19 NOTE — NUR
START OF SHIFT



PT IS A 34 Y/O F ADMITTED ON 06/14/18 FOR MEDICALLY SUPERVISED ETOH, BENZO AND OPIATE 
WITHDRAWAL. PT HAS COMPLETED A 5 DAY ATIVAN AND SUBUTEX TAPER YESTERDAY. PT IS MEDICALLY 
CLEARED TO BE DISCHARGED. LAST COWS 10 AND CIWA 10, TORADOL, ROBAXIN, ZOFRAN, AND MOM PRNS 
GIVEN LAST NIGHT. PT SLEPT 5 HRS. RECEIVED PT A/OX4, RESPIRATIONS EVEN AND UNLABORED. PT IS 
DISHEVELED AND HA AN UNKEMPT ROOM, PT PRESENTS ANXIETY, AGITATION, IRRITABILITY, 
RESTLESSNESS, HEADACHE, INTERMITTENT CHILLS AND SWEATING, CLAMMY SKIN, GENERALIZED BODY 
ACHES, FATIGUE, DYSPHORIA, ANHEDONIA, CONSTIPATION, STOMACH CRAMPS, NAUSEA, DIFFICULTY 
THINKING AND CONCENTRATING, AND TREMORS ARE NOTED. ENCOURAGED PT TO ATTEND GROUPS TO LEARN 
SKILLS TO MAINTAIN SOBRIETY. SIDE RAILS UPX2, BED IN LOW POSITION. CALL LIGHT WITHIN REACH. 
SAFETY MEASURES IN PLACE. WILL CONTINUE TO MONITOR.

## 2018-06-19 NOTE — NUR
PRN TORADOL INJ REASSESSMENT



Pt reports pain decreased to 4/10. Safety measures in place. Call light within reach. Will 
continue to monitor.

## 2018-06-19 NOTE — NUR
PRN ROBAXIN AND ZOFRAN ODT ADMINISTRATION 



Pt reports body aches 8/10 and nausea without vomiting. Safety measures in place. Call light 
within reach. Will continue to monitor.

## 2018-06-19 NOTE — NUR
END OF SHIFT



Pt is a 32 y/o female admitted on 06/14/18 for ETOH, benzo and opiate withdrawal. Pt 
finished her 5 day Ativan and 5 day Subutex taper and is scheduled to be d/c today. Pt 
refuses side rail pads, provided education about risks, pt verbalized understanding. Pt 
presented with anxiety, restlessness, agitation, headache, sweats, chills, nausea without 
vomiting, difficulty thinking clearly, difficulty concentrating, poor memory recall, 
difficulty falling and staying asleep, disheveled appearance, racing thoughts, loose 
thoughts, body aches, headache, constipation, flat affect, unkempt room and was withdrawn. 
Scheduled medications and PRN Toradol, Milk of Mag, Robaxin and Zofran odt administered, 
effective in S/S of withdrawal as verbalized by pt. Last COWS 10 and CIWA 10. Pt slept 4 
hours. Intake 1000 ml, void x 1, stool x 0. Safety measures in place. Call light within 
reach. Pts needs have been met. Endorsed to day shift nurse.

## 2018-06-19 NOTE — NUR
DISCHARGE NOTE



PT IS IN STABLE CONDITION, VS ARE WNL. PT IS A/OX4, RESPIRATIONS EVEN AND UNLABORED. PT HAS 
BEEN GIVEN D/C INSTRUCTIONS AND VERBALIZED UNDERSTANDING. LAST COWS 6 AND CIWA 7. MD AWARE 
OF PT DISCHARGE. PT HAS BEEN DISCHARGED TODAY 06/19/18 AT 1518 WITH ALL BELONGINGS, 
PRESCRIPTION AND DISCHARGE PAPERWORK. PT HAS PICKED UP BY [facility] AND HAS 
BEEN TAKEN TO [facility].

## 2018-10-29 NOTE — NUR
MOTRIN/ZOFRAN/ROBAXIN REASSESSMENT

Patient reports medication effective, current pain level 0/10, decrease in nausea and no 
complains of muscle aches, will continue to monitor closely. Yes

## 2022-05-30 NOTE — NUR
REASSESS MOTRIN- PT REPORTS HEADACHE #5/10 IMPROVED

ROBAXIN- PT REPORTS BODY ACHES IMPROVED. no chest pain and no edema.

## 2023-02-08 NOTE — NUR
PRN MEDS

Pt is c/o of stomach cramps/muscle aches 5/10, non pharmacological intervention ineffective. 
Administered PRN Bentyl 20mg Po 1 tab/Robaxin 750mg Po 1 tab as ordered. Will cont to 
monitor and reassess the pt. RN staff reported to this writer that the patient was complaining of shoulder and back pain. Not acting quite right. In addition, she stated that she needed a bowel movement. Has had stool softeners. Ordered a one time enema. Given fleets enema X 1.  RN staff attempted to help the patient pass stool. Unsuccessful. Reported that the stool is very hard and the patient was yelling out in pain. Sent out to ER for evaluation.